# Patient Record
Sex: FEMALE | Race: WHITE | NOT HISPANIC OR LATINO
[De-identification: names, ages, dates, MRNs, and addresses within clinical notes are randomized per-mention and may not be internally consistent; named-entity substitution may affect disease eponyms.]

---

## 2019-04-19 ENCOUNTER — RECORD ABSTRACTING (OUTPATIENT)
Age: 29
End: 2019-04-19

## 2019-04-19 LAB — CYTOLOGY CVX/VAG DOC THIN PREP: NORMAL

## 2019-04-22 ENCOUNTER — APPOINTMENT (OUTPATIENT)
Dept: OBGYN | Facility: CLINIC | Age: 29
End: 2019-04-22
Payer: COMMERCIAL

## 2019-04-22 ENCOUNTER — TRANSCRIPTION ENCOUNTER (OUTPATIENT)
Age: 29
End: 2019-04-22

## 2019-04-22 ENCOUNTER — APPOINTMENT (OUTPATIENT)
Dept: OBGYN | Facility: CLINIC | Age: 29
End: 2019-04-22

## 2019-04-22 VITALS
DIASTOLIC BLOOD PRESSURE: 90 MMHG | BODY MASS INDEX: 23.41 KG/M2 | HEIGHT: 61 IN | SYSTOLIC BLOOD PRESSURE: 120 MMHG | WEIGHT: 124 LBS

## 2019-04-22 LAB
BILIRUB UR QL STRIP: NORMAL
CLARITY UR: CLEAR
COLLECTION METHOD: NORMAL
GLUCOSE UR-MCNC: NORMAL
HCG UR QL: 0.2 EU/DL
HCG UR QL: NEGATIVE
HGB UR QL STRIP.AUTO: NORMAL
KETONES UR-MCNC: NORMAL
LEUKOCYTE ESTERASE UR QL STRIP: NORMAL
NITRITE UR QL STRIP: NORMAL
PH UR STRIP: 7.5
PROT UR STRIP-MCNC: NORMAL
QUALITY CONTROL: YES
SP GR UR STRIP: 1.01

## 2019-04-22 PROCEDURE — 81025 URINE PREGNANCY TEST: CPT

## 2019-04-22 PROCEDURE — 81003 URINALYSIS AUTO W/O SCOPE: CPT | Mod: QW

## 2019-04-22 PROCEDURE — 99395 PREV VISIT EST AGE 18-39: CPT

## 2019-04-22 NOTE — PROCEDURE
[Cervical Pap Smear] : cervical Pap smear [GC & Chlamydia via Pap] : GC & Chlamydia via Pap [Liquid Base] : liquid base [Tolerated Well] : the patient tolerated the procedure well [No Complications] : there were no complications

## 2019-04-22 NOTE — PHYSICAL EXAM
[Awake] : awake [Alert] : alert [Mass] : no breast mass [Nipple Discharge] : no nipple discharge [Soft] : soft [Axillary LAD] : no axillary lymphadenopathy [Tender] : non tender [Distended] : not distended [Oriented x3] : oriented to person, place, and time [Normal] : uterus [Motion Tenderness] : there was no cervical motion tenderness [Tenderness] : nontender [Enlarged ___ wks] : not enlarged [Mass ___ cm] : no uterine mass was palpated [Uterine Adnexae] : were not tender and not enlarged [Adnexa Tenderness] : were not tender [Ovarian Mass (___ Cm)] : there were no adnexal masses

## 2019-04-22 NOTE — HISTORY OF PRESENT ILLNESS
[___ Year(s) Ago] : [unfilled] year(s) ago [Healthy Diet] : a healthy diet [Good] : being in good health [Regular Exercise] : regular exercise [Last Pap ___] : Last cervical pap smear was [unfilled] [Reproductive Age] : is of reproductive age [Definite:  ___ (Date)] : the last menstrual period was [unfilled] [Menarche Age: ____] : age at menarche was [unfilled] [Sexually Active] : is sexually active [Monogamous] : is monogamous [Condoms] : uses condoms [Contraception] : uses contraception [Male ___] : [unfilled] male [NA] : N/A [Weight Concerns] : no concerns with her weight [Pregnancy History] : denies prior pregnancies

## 2019-04-22 NOTE — DISCUSSION/SUMMARY
[FreeTextEntry1] : > 20 mins spent discussion contraceptive options. She is most interested in the LNG-IUC.\par \par We discussed the LNG- IUC device and insertion procedure.  We reviewed the risks, benefits and alternatives.  Specifically we reviewed risks including but not limited to perforation, migration, infection and expulsion.  The expected side effects were reviewed.  She was provided with an informational pamphlet for Benji.\par \par f/u when she makes decision about the device.

## 2019-04-22 NOTE — COUNSELING
[Breast Self Exam] : breast self exam [Nutrition] : nutrition [Exercise] : exercise [STD (testing, results, tx)] : STD (testing, results, tx) [Vitamins/Supplements] : vitamins/supplements [Contraception] : contraception

## 2019-04-23 LAB
C TRACH RRNA SPEC QL NAA+PROBE: NOT DETECTED
HPV HIGH+LOW RISK DNA PNL CVX: NOT DETECTED
N GONORRHOEA RRNA SPEC QL NAA+PROBE: NOT DETECTED
SOURCE TP AMPLIFICATION: NORMAL

## 2019-04-25 LAB — CYTOLOGY CVX/VAG DOC THIN PREP: NORMAL

## 2019-07-09 ENCOUNTER — APPOINTMENT (OUTPATIENT)
Dept: OBGYN | Facility: CLINIC | Age: 29
End: 2019-07-09
Payer: COMMERCIAL

## 2019-07-09 VITALS
SYSTOLIC BLOOD PRESSURE: 106 MMHG | DIASTOLIC BLOOD PRESSURE: 70 MMHG | BODY MASS INDEX: 23.6 KG/M2 | WEIGHT: 125 LBS | HEIGHT: 61 IN

## 2019-07-09 PROCEDURE — 99213 OFFICE O/P EST LOW 20 MIN: CPT

## 2019-07-09 NOTE — DISCUSSION/SUMMARY
[FreeTextEntry1] : We discussed the Kyleena device and insertion procedure.  We reviewed the risks, benefits and alternatives.  Specifically we reviewed risks including but not limited to perforation, migration, infection and expulsion.  The expected side effects were reviewed.  Timing of insertion was discussed and pt will call for an appointment. She will premedicate with cytotec and ibuprofen.\par

## 2019-07-09 NOTE — HISTORY OF PRESENT ILLNESS
[___ Month(s) Ago] : [unfilled] month(s) ago [Good] : being in good health [Healthy Diet] : a healthy diet [Regular Exercise] : regular exercise [Last Pap ___] : Last cervical pap smear was [unfilled] [Reproductive Age] : is of reproductive age [Last Screen ___] : last STD screen was [unfilled] [Definite:  ___ (Date)] : the last menstrual period was [unfilled] [Menarche Age: ____] : age at menarche was [unfilled] [Sexually Active] : is sexually active [Monogamous] : is monogamous [Male ___] : [unfilled] male [Weight Concerns] : no concerns with her weight [Contraception] : does not use contraception

## 2019-07-18 LAB
C TRACH RRNA SPEC QL NAA+PROBE: NOT DETECTED
N GONORRHOEA RRNA SPEC QL NAA+PROBE: NOT DETECTED
SOURCE AMPLIFICATION: NORMAL

## 2019-07-22 ENCOUNTER — APPOINTMENT (OUTPATIENT)
Dept: OBGYN | Facility: CLINIC | Age: 29
End: 2019-07-22
Payer: COMMERCIAL

## 2019-07-22 VITALS
HEIGHT: 61 IN | SYSTOLIC BLOOD PRESSURE: 108 MMHG | DIASTOLIC BLOOD PRESSURE: 70 MMHG | WEIGHT: 126 LBS | BODY MASS INDEX: 23.79 KG/M2

## 2019-07-22 LAB
HCG UR QL: NEGATIVE
QUALITY CONTROL: YES

## 2019-07-22 PROCEDURE — XXXXX: CPT

## 2019-07-22 NOTE — HISTORY OF PRESENT ILLNESS
[Last Pap ___] : Last cervical pap smear was [unfilled] [Reproductive Age] : is of reproductive age [Last Screen ___] : last STD screen was [unfilled] [Irregular Menses] : irregular menses [Definite:  ___ (Date)] : the last menstrual period was [unfilled] [Menarche Age: ____] : age at menarche was [unfilled] [Sexually Active] : is sexually active [Contraception] : uses contraception [Condoms] : uses condoms [Male ___] : [unfilled] male

## 2019-07-25 LAB
BASOPHILS # BLD AUTO: 0.04 K/UL
BASOPHILS NFR BLD AUTO: 0.6 %
BILIRUB UR QL STRIP: NORMAL
C TRACH RRNA SPEC QL NAA+PROBE: NOT DETECTED
CANDIDA VAG CYTO: NOT DETECTED
COLLECTION METHOD: NORMAL
EOSINOPHIL # BLD AUTO: 0.11 K/UL
EOSINOPHIL NFR BLD AUTO: 1.7 %
ESTRADIOL SERPL-MCNC: 155 PG/ML
FSH SERPL-MCNC: 4.7 IU/L
G VAGINALIS+PREV SP MTYP VAG QL MICRO: NOT DETECTED
GLUCOSE UR-MCNC: NORMAL
HCG SERPL-MCNC: <1 MIU/ML
HCG UR QL: 0.2 EU/DL
HCT VFR BLD CALC: 38.9 %
HGB BLD-MCNC: 12.8 G/DL
HGB UR QL STRIP.AUTO: NORMAL
IMM GRANULOCYTES NFR BLD AUTO: 0.2 %
KETONES UR-MCNC: NORMAL
LEUKOCYTE ESTERASE UR QL STRIP: NORMAL
LYMPHOCYTES # BLD AUTO: 2.19 K/UL
LYMPHOCYTES NFR BLD AUTO: 33.3 %
MAN DIFF?: NORMAL
MCHC RBC-ENTMCNC: 29.1 PG
MCHC RBC-ENTMCNC: 32.9 GM/DL
MCV RBC AUTO: 88.4 FL
MONOCYTES # BLD AUTO: 0.5 K/UL
MONOCYTES NFR BLD AUTO: 7.6 %
N GONORRHOEA RRNA SPEC QL NAA+PROBE: NOT DETECTED
NEUTROPHILS # BLD AUTO: 3.73 K/UL
NEUTROPHILS NFR BLD AUTO: 56.6 %
NITRITE UR QL STRIP: NORMAL
PH UR STRIP: 7
PLATELET # BLD AUTO: 224 K/UL
PROLACTIN SERPL-MCNC: 19 NG/ML
PROT UR STRIP-MCNC: NORMAL
RBC # BLD: 4.4 M/UL
RBC # FLD: 12.5 %
SOURCE AMPLIFICATION: NORMAL
SP GR UR STRIP: 1.01
T VAGINALIS VAG QL WET PREP: NOT DETECTED
TSH SERPL-ACNC: 2.23 UIU/ML
WBC # FLD AUTO: 6.58 K/UL

## 2019-08-01 ENCOUNTER — RESULT CHARGE (OUTPATIENT)
Age: 29
End: 2019-08-01

## 2019-08-01 ENCOUNTER — APPOINTMENT (OUTPATIENT)
Dept: OBGYN | Facility: CLINIC | Age: 29
End: 2019-08-01
Payer: COMMERCIAL

## 2019-08-01 VITALS
SYSTOLIC BLOOD PRESSURE: 116 MMHG | BODY MASS INDEX: 24.17 KG/M2 | WEIGHT: 128 LBS | HEIGHT: 61 IN | DIASTOLIC BLOOD PRESSURE: 70 MMHG

## 2019-08-01 PROCEDURE — 76376 3D RENDER W/INTRP POSTPROCES: CPT | Mod: 59

## 2019-08-01 PROCEDURE — 99213 OFFICE O/P EST LOW 20 MIN: CPT | Mod: 25

## 2019-08-01 PROCEDURE — 81003 URINALYSIS AUTO W/O SCOPE: CPT | Mod: QW

## 2019-08-01 PROCEDURE — 76830 TRANSVAGINAL US NON-OB: CPT

## 2019-08-01 PROCEDURE — 81025 URINE PREGNANCY TEST: CPT

## 2019-08-01 NOTE — HISTORY OF PRESENT ILLNESS
[___ Month(s) Ago] : [unfilled] month(s) ago [Last Pap ___] : Last cervical pap smear was [unfilled] [Reproductive Age] : is of reproductive age [HPV Vaccine NA Due to Age] : HPV vaccine not available to patient due to age [Definite:  ___ (Date)] : the last menstrual period was [unfilled] [Menarche Age: ____] : age at menarche was [unfilled] [Sexually Active] : is sexually active [Monogamous] : is monogamous [Condoms] : uses condoms [Contraception] : uses contraception [Male ___] : [unfilled] male

## 2019-08-02 NOTE — PHYSICAL EXAM
[Normal] : uterus [Motion Tenderness] : there was no cervical motion tenderness [Tenderness] : nontender [Enlarged ___ wks] : not enlarged [Mass ___ cm] : no uterine mass was palpated [Uterine Adnexae] : were not tender and not enlarged

## 2019-08-02 NOTE — DISCUSSION/SUMMARY
[FreeTextEntry1] : 1. follow up 6 weeks- pelvic sono for cyst surveillance.  call parameters for pain reviewed. sxs of ovarian torsion reviewed.\par 2. post coital bleeding likely from cervix- slight bleeding noted today on exam.  confirm negative gc/ct- if negative she will monitor.\par 3. tx for yeast provided.\par 4. ok for IUD after sonogram follow up pending results

## 2019-08-05 LAB
BILIRUB UR QL STRIP: NORMAL
C TRACH RRNA SPEC QL NAA+PROBE: NOT DETECTED
CLARITY UR: CLEAR
COLLECTION METHOD: NORMAL
GLUCOSE UR-MCNC: NORMAL
HCG UR QL: 0.2 EU/DL
HCG UR QL: NEGATIVE
HGB UR QL STRIP.AUTO: ABNORMAL
KETONES UR-MCNC: NORMAL
LEUKOCYTE ESTERASE UR QL STRIP: NORMAL
N GONORRHOEA RRNA SPEC QL NAA+PROBE: NOT DETECTED
NITRITE UR QL STRIP: NORMAL
PH UR STRIP: 8.5
PROT UR STRIP-MCNC: NORMAL
QUALITY CONTROL: YES
SOURCE AMPLIFICATION: NORMAL
SP GR UR STRIP: 1.02

## 2019-09-07 ENCOUNTER — APPOINTMENT (OUTPATIENT)
Dept: OBGYN | Facility: CLINIC | Age: 29
End: 2019-09-07

## 2019-09-23 ENCOUNTER — APPOINTMENT (OUTPATIENT)
Dept: OBGYN | Facility: CLINIC | Age: 29
End: 2019-09-23
Payer: COMMERCIAL

## 2019-09-23 VITALS
SYSTOLIC BLOOD PRESSURE: 116 MMHG | WEIGHT: 125 LBS | BODY MASS INDEX: 23.6 KG/M2 | DIASTOLIC BLOOD PRESSURE: 72 MMHG | HEIGHT: 61 IN

## 2019-09-23 PROCEDURE — 76830 TRANSVAGINAL US NON-OB: CPT

## 2019-09-23 PROCEDURE — 99213 OFFICE O/P EST LOW 20 MIN: CPT

## 2019-09-23 NOTE — DISCUSSION/SUMMARY
[FreeTextEntry1] : We reviewed her pelvic sonogram showing an interval reduction in the size of the complex right ovarian cyst.  There is a newcomplex left ovarian cyst.\par \par Recommend f/u pelvic US in 2-3 mos and MD visit same day to review results

## 2019-09-23 NOTE — HISTORY OF PRESENT ILLNESS
[___ Month(s) Ago] : [unfilled] month(s) ago [Reproductive Age] : is of reproductive age [Last Pap ___] : Last cervical pap smear was [unfilled] [Definite:  ___ (Date)] : the last menstrual period was [unfilled] [Menarche Age: ____] : age at menarche was [unfilled] [Sexually Active] : is sexually active [Contraception] : uses contraception [Condoms] : uses condoms [Male ___] : [unfilled] male

## 2019-11-27 ENCOUNTER — ASOB RESULT (OUTPATIENT)
Age: 29
End: 2019-11-27

## 2019-11-27 ENCOUNTER — APPOINTMENT (OUTPATIENT)
Dept: OBGYN | Facility: CLINIC | Age: 29
End: 2019-11-27
Payer: COMMERCIAL

## 2019-11-27 VITALS
WEIGHT: 125 LBS | SYSTOLIC BLOOD PRESSURE: 110 MMHG | BODY MASS INDEX: 23.6 KG/M2 | HEIGHT: 61 IN | DIASTOLIC BLOOD PRESSURE: 70 MMHG

## 2019-11-27 PROCEDURE — 99214 OFFICE O/P EST MOD 30 MIN: CPT | Mod: 25

## 2019-11-27 PROCEDURE — 76830 TRANSVAGINAL US NON-OB: CPT

## 2019-11-27 NOTE — HISTORY OF PRESENT ILLNESS
[Last Pap ___] : Last cervical pap smear was [unfilled] [Reproductive Age] : is of reproductive age [Last Screen ___] : last STD screen was [unfilled] [Definite:  ___ (Date)] : the last menstrual period was [unfilled] [Menarche Age: ____] : age at menarche was [unfilled] [Sexually Active] : is sexually active [Contraception] : uses contraception [Condoms] : uses condoms [Male ___] : [unfilled] male [NA] : N/A [de-identified] : PELVIC US 11/27/2019

## 2019-11-27 NOTE — REVIEW OF SYSTEMS
[Nl] : Hematologic/Lymphatic [Fever] : no fever [Chills] : no chills [Pelvic Pain] : pelvic pain [Abn Vag Bleeding] : abnormal vaginal bleeding

## 2020-02-17 ENCOUNTER — ASOB RESULT (OUTPATIENT)
Age: 30
End: 2020-02-17

## 2020-02-17 ENCOUNTER — APPOINTMENT (OUTPATIENT)
Dept: OBGYN | Facility: CLINIC | Age: 30
End: 2020-02-17
Payer: COMMERCIAL

## 2020-02-17 VITALS
DIASTOLIC BLOOD PRESSURE: 78 MMHG | WEIGHT: 125 LBS | SYSTOLIC BLOOD PRESSURE: 112 MMHG | BODY MASS INDEX: 23.6 KG/M2 | HEIGHT: 61 IN

## 2020-02-17 DIAGNOSIS — Z30.09 ENCOUNTER FOR OTHER GENERAL COUNSELING AND ADVICE ON CONTRACEPTION: ICD-10-CM

## 2020-02-17 DIAGNOSIS — G43.829 MENSTRUAL MIGRAINE, NOT INTRACTABLE, W/OUT STATUS MIGRAINOSUS: ICD-10-CM

## 2020-02-17 DIAGNOSIS — Z86.19 PERSONAL HISTORY OF OTHER INFECTIOUS AND PARASITIC DISEASES: ICD-10-CM

## 2020-02-17 DIAGNOSIS — Z87.42 PERSONAL HISTORY OF OTHER DISEASES OF THE FEMALE GENITAL TRACT: ICD-10-CM

## 2020-02-17 DIAGNOSIS — B00.9 HERPESVIRAL INFECTION, UNSPECIFIED: ICD-10-CM

## 2020-02-17 DIAGNOSIS — Z87.09 PERSONAL HISTORY OF OTHER DISEASES OF THE RESPIRATORY SYSTEM: ICD-10-CM

## 2020-02-17 DIAGNOSIS — J38.3 OTHER DISEASES OF VOCAL CORDS: ICD-10-CM

## 2020-02-17 DIAGNOSIS — K21.0 GASTRO-ESOPHAGEAL REFLUX DISEASE WITH ESOPHAGITIS: ICD-10-CM

## 2020-02-17 DIAGNOSIS — M25.569 PAIN IN UNSPECIFIED KNEE: ICD-10-CM

## 2020-02-17 DIAGNOSIS — M23.91 UNSPECIFIED INTERNAL DERANGEMENT OF RIGHT KNEE: ICD-10-CM

## 2020-02-17 DIAGNOSIS — Z87.898 PERSONAL HISTORY OF OTHER SPECIFIED CONDITIONS: ICD-10-CM

## 2020-02-17 DIAGNOSIS — H90.5 UNSPECIFIED SENSORINEURAL HEARING LOSS: ICD-10-CM

## 2020-02-17 DIAGNOSIS — N76.0 ACUTE VAGINITIS: ICD-10-CM

## 2020-02-17 DIAGNOSIS — H90.2 CONDUCTIVE HEARING LOSS, UNSPECIFIED: ICD-10-CM

## 2020-02-17 DIAGNOSIS — Z20.828 CONTACT WITH AND (SUSPECTED) EXPOSURE TO OTHER VIRAL COMMUNICABLE DISEASES: ICD-10-CM

## 2020-02-17 DIAGNOSIS — B96.89 ACUTE PHARYNGITIS DUE TO OTHER SPECIFIED ORGANISMS: ICD-10-CM

## 2020-02-17 DIAGNOSIS — J38.4 EDEMA OF LARYNX: ICD-10-CM

## 2020-02-17 DIAGNOSIS — Z09 ENCOUNTER FOR FOLLOW-UP EXAMINATION AFTER COMPLETED TREATMENT FOR CONDITIONS OTHER THAN MALIGNANT NEOPLASM: ICD-10-CM

## 2020-02-17 DIAGNOSIS — N83.299 OTHER OVARIAN CYST, UNSPECIFIED SIDE: ICD-10-CM

## 2020-02-17 DIAGNOSIS — Z01.419 ENCOUNTER FOR GYNECOLOGICAL EXAMINATION (GENERAL) (ROUTINE) W/OUT ABNORMAL FINDINGS: ICD-10-CM

## 2020-02-17 DIAGNOSIS — N93.0 POSTCOITAL AND CONTACT BLEEDING: ICD-10-CM

## 2020-02-17 DIAGNOSIS — J02.8 ACUTE PHARYNGITIS DUE TO OTHER SPECIFIED ORGANISMS: ICD-10-CM

## 2020-02-17 DIAGNOSIS — Z87.440 PERSONAL HISTORY OF URINARY (TRACT) INFECTIONS: ICD-10-CM

## 2020-02-17 PROCEDURE — 99213 OFFICE O/P EST LOW 20 MIN: CPT | Mod: 25

## 2020-02-17 PROCEDURE — 76830 TRANSVAGINAL US NON-OB: CPT

## 2020-02-17 NOTE — DISCUSSION/SUMMARY
[FreeTextEntry1] : we reviewed her sonogram results- no change in cysts.  we discussed the possible polyp and how this may have contributed to her irregular bleeding.  this was not previously noted on US. We discussed the potential need for a hysteroscopy.  \par \par she is worried about the possibility of endometriosis and what this may mean for her future fertility as well.\par \par she will continue with the nuvaring for now.  I will review her sono with MD and call pt with further recommendations.\par \par

## 2020-02-17 NOTE — HISTORY OF PRESENT ILLNESS
[Reproductive Age] : is of reproductive age [Last Pap ___] : Last cervical pap smear was [unfilled] [Intravaginal Ring] : uses the intravaginal ring [Definite:  ___ (Date)] : the last menstrual period was [unfilled] [Menarche Age: ____] : age at menarche was [unfilled] [Monogamous] : is monogamous [Sexually Active] : is sexually active [Contraception] : uses contraception [Vaginal Ring] : uses a vaginal ring [Condoms] : uses condoms [No] : no [Male ___] : [unfilled] male [___ Month(s) Ago] : [unfilled] month(s) ago [Good] : being in good health [Regular Exercise] : regular exercise [Menstrual Problems] : reports normal menses [Pregnancy History] : denies prior pregnancies [Fever] : no fever [Vomiting] : no vomiting [Diarrhea] : no diarrhea [Nausea] : no nausea [Pelvic Pressure] : no pelvic pressure [Dysuria] : no dysuria [Vaginal Bleeding] : no vaginal bleeding

## 2020-03-02 ENCOUNTER — TRANSCRIPTION ENCOUNTER (OUTPATIENT)
Age: 30
End: 2020-03-02

## 2020-03-11 ENCOUNTER — APPOINTMENT (OUTPATIENT)
Dept: OBGYN | Facility: CLINIC | Age: 30
End: 2020-03-11
Payer: COMMERCIAL

## 2020-03-11 VITALS
SYSTOLIC BLOOD PRESSURE: 108 MMHG | DIASTOLIC BLOOD PRESSURE: 72 MMHG | HEIGHT: 61 IN | WEIGHT: 122 LBS | BODY MASS INDEX: 23.03 KG/M2

## 2020-03-11 DIAGNOSIS — N84.0 POLYP OF CORPUS UTERI: ICD-10-CM

## 2020-03-11 LAB
HCG UR QL: NEGATIVE
QUALITY CONTROL: YES

## 2020-03-11 PROCEDURE — 58558Z: CUSTOM

## 2020-03-11 PROCEDURE — 81025 URINE PREGNANCY TEST: CPT

## 2020-03-11 NOTE — REVIEW OF SYSTEMS
[Breast Pain] : breast pain [Headache] : headache [Nl] : Hematologic/Lymphatic [Fever] : no fever [Chills] : no chills [Pelvic Pain] : no pelvic pain [Abn Vag Bleeding] : abnormal vaginal bleeding

## 2020-03-11 NOTE — HISTORY OF PRESENT ILLNESS
[Last Pap ___] : Last cervical pap smear was [unfilled] [Reproductive Age] : is of reproductive age [Last Screen ___] : last STD screen was [unfilled] [unknown] : the patient is unsure of the date of her LMP [Menarche Age: ____] : age at menarche was [unfilled] [Sexually Active] : is sexually active [Contraception] : uses contraception [Vaginal Ring] : uses a vaginal ring [Male ___] : [unfilled] male [NA] : N/A [de-identified] : Pelvic us 2/17/2020

## 2020-03-16 ENCOUNTER — TRANSCRIPTION ENCOUNTER (OUTPATIENT)
Age: 30
End: 2020-03-16

## 2020-03-16 LAB — CORE LAB BIOPSY: NORMAL

## 2020-04-06 ENCOUNTER — TRANSCRIPTION ENCOUNTER (OUTPATIENT)
Age: 30
End: 2020-04-06

## 2020-04-06 DIAGNOSIS — Z30.40 ENCOUNTER FOR SURVEILLANCE OF CONTRACEPTIVES, UNSPECIFIED: ICD-10-CM

## 2020-04-13 ENCOUNTER — TRANSCRIPTION ENCOUNTER (OUTPATIENT)
Age: 30
End: 2020-04-13

## 2020-04-28 ENCOUNTER — TRANSCRIPTION ENCOUNTER (OUTPATIENT)
Age: 30
End: 2020-04-28

## 2020-04-30 ENCOUNTER — TRANSCRIPTION ENCOUNTER (OUTPATIENT)
Age: 30
End: 2020-04-30

## 2020-05-01 ENCOUNTER — TRANSCRIPTION ENCOUNTER (OUTPATIENT)
Age: 30
End: 2020-05-01

## 2020-05-18 ENCOUNTER — ASOB RESULT (OUTPATIENT)
Age: 30
End: 2020-05-18

## 2020-05-18 ENCOUNTER — APPOINTMENT (OUTPATIENT)
Dept: OBGYN | Facility: CLINIC | Age: 30
End: 2020-05-18
Payer: COMMERCIAL

## 2020-05-18 VITALS
SYSTOLIC BLOOD PRESSURE: 112 MMHG | BODY MASS INDEX: 23.03 KG/M2 | HEIGHT: 61 IN | WEIGHT: 122 LBS | DIASTOLIC BLOOD PRESSURE: 64 MMHG

## 2020-05-18 DIAGNOSIS — N93.9 ABNORMAL UTERINE AND VAGINAL BLEEDING, UNSPECIFIED: ICD-10-CM

## 2020-05-18 DIAGNOSIS — N83.8 OTHER NONINFLAMMATORY DISORDERS OF OVARY, FALLOPIAN TUBE AND BROAD LIGAMENT: ICD-10-CM

## 2020-05-18 DIAGNOSIS — N92.1 EXCESSIVE AND FREQUENT MENSTRUATION WITH IRREGULAR CYCLE: ICD-10-CM

## 2020-05-18 PROCEDURE — 76830 TRANSVAGINAL US NON-OB: CPT

## 2020-05-18 PROCEDURE — 99395 PREV VISIT EST AGE 18-39: CPT

## 2020-05-18 PROCEDURE — 99214 OFFICE O/P EST MOD 30 MIN: CPT | Mod: 25

## 2020-05-18 PROCEDURE — 36415 COLL VENOUS BLD VENIPUNCTURE: CPT

## 2020-05-18 PROCEDURE — 81025 URINE PREGNANCY TEST: CPT

## 2020-05-18 NOTE — CHIEF COMPLAINT
[Annual Visit] : annual visit [FreeTextEntry1] : sono and annual- c/o bleeding every day for the past 5 months

## 2020-05-18 NOTE — REVIEW OF SYSTEMS
[Nl] : Integumentary [Abn Vag Bleeding] : abnormal vaginal bleeding [Fever] : no fever [Chills] : no chills [Chest Pain] : no chest pain [Palpitations] : no palpitations [Dyspnea] : no shortness of breath [Wheezing] : no wheezing [Cough] : no cough [SOB on Exertion] : no shortness of breath during exertion [Abdominal Pain] : no abdominal pain [Vomiting] : no vomiting [Pelvic Pain] : pelvic pain

## 2020-05-18 NOTE — COUNSELING
[Breast Self Exam] : breast self exam [STD (testing, results, tx)] : STD (testing, results, tx) [Contraception] : contraception [Safe Sexual Practices] : safe sexual practices

## 2020-05-18 NOTE — HISTORY OF PRESENT ILLNESS
[1 Year Ago] : 1 year ago [Good] : being in good health [Last Pap ___] : Last cervical pap smear was [unfilled] [Reproductive Age] : is of reproductive age [Contraception] : uses contraception [Menarche Age: ____] : age at menarche was [unfilled] [Sexually Active] : is sexually active [Male ___] : [unfilled] male [unknown] : the patient is unsure of the date of her LMP [Menstrual Problems] : reports normal menses [Pregnancy History] : denies prior pregnancies

## 2020-05-18 NOTE — PHYSICAL EXAM
[Awake] : awake [Alert] : alert [Acute Distress] : no acute distress [Mass] : no breast mass [Axillary LAD] : no axillary lymphadenopathy [Nipple Discharge] : no nipple discharge [Tender] : non tender [Soft] : soft [Distended] : not distended [Oriented x3] : oriented to person, place, and time [Depressed Mood] : not depressed [Flat Affect] : affect not flat [Normal] : uterus [Labia Majora] : labia major [Moderate] : there was moderate vaginal bleeding [Labia Minora] : labia minora [Pap Obtained] : a Pap smear was performed [Tenderness] : nontender [Adnexa Tenderness] : were not tender

## 2020-05-19 LAB
BASOPHILS # BLD AUTO: 0.04 K/UL
BASOPHILS NFR BLD AUTO: 0.5 %
C TRACH RRNA SPEC QL NAA+PROBE: NOT DETECTED
EOSINOPHIL # BLD AUTO: 0.18 K/UL
EOSINOPHIL NFR BLD AUTO: 2.2 %
FSH SERPL-MCNC: 3.1 IU/L
HCG UR QL: NEGATIVE
HCT VFR BLD CALC: 44 %
HGB BLD-MCNC: 14.1 G/DL
HPV HIGH+LOW RISK DNA PNL CVX: NOT DETECTED
IMM GRANULOCYTES NFR BLD AUTO: 0.2 %
LH SERPL-ACNC: 1.9 IU/L
LYMPHOCYTES # BLD AUTO: 2.66 K/UL
LYMPHOCYTES NFR BLD AUTO: 32.5 %
MAN DIFF?: NORMAL
MCHC RBC-ENTMCNC: 28.4 PG
MCHC RBC-ENTMCNC: 32 GM/DL
MCV RBC AUTO: 88.5 FL
MONOCYTES # BLD AUTO: 0.63 K/UL
MONOCYTES NFR BLD AUTO: 7.7 %
N GONORRHOEA RRNA SPEC QL NAA+PROBE: NOT DETECTED
NEUTROPHILS # BLD AUTO: 4.65 K/UL
NEUTROPHILS NFR BLD AUTO: 56.9 %
PLATELET # BLD AUTO: 269 K/UL
PROLACTIN SERPL-MCNC: 18 NG/ML
QUALITY CONTROL: YES
RBC # BLD: 4.97 M/UL
RBC # FLD: 12.2 %
SOURCE TP AMPLIFICATION: NORMAL
TSH SERPL-ACNC: 2.35 UIU/ML
WBC # FLD AUTO: 8.18 K/UL

## 2020-05-20 LAB
CA 125 (LABCORP): 34.2 U/ML
CYTOLOGY CVX/VAG DOC THIN PREP: NORMAL
HE 4: 52.3 PMOL/L
POSTMENOPAUSAL ROMA: 2
PREMENOPAUSAL ROMA: 0.86
ROMA COMMENT: NORMAL

## 2020-05-28 ENCOUNTER — RESULT REVIEW (OUTPATIENT)
Age: 30
End: 2020-05-28

## 2020-05-28 ENCOUNTER — OUTPATIENT (OUTPATIENT)
Dept: OUTPATIENT SERVICES | Facility: HOSPITAL | Age: 30
LOS: 1 days | End: 2020-05-28

## 2020-05-28 ENCOUNTER — APPOINTMENT (OUTPATIENT)
Dept: MRI IMAGING | Facility: CLINIC | Age: 30
End: 2020-05-28
Payer: COMMERCIAL

## 2020-05-28 DIAGNOSIS — N93.9 ABNORMAL UTERINE AND VAGINAL BLEEDING, UNSPECIFIED: ICD-10-CM

## 2020-05-28 PROCEDURE — 72197 MRI PELVIS W/O & W/DYE: CPT | Mod: 26

## 2020-05-28 PROCEDURE — 74183 MRI ABD W/O CNTR FLWD CNTR: CPT | Mod: 26

## 2020-06-05 ENCOUNTER — APPOINTMENT (OUTPATIENT)
Dept: OBGYN | Facility: CLINIC | Age: 30
End: 2020-06-05
Payer: COMMERCIAL

## 2020-06-05 VITALS
WEIGHT: 127 LBS | BODY MASS INDEX: 23.98 KG/M2 | HEIGHT: 61 IN | DIASTOLIC BLOOD PRESSURE: 68 MMHG | SYSTOLIC BLOOD PRESSURE: 120 MMHG

## 2020-06-05 DIAGNOSIS — Z87.42 PERSONAL HISTORY OF OTHER DISEASES OF THE FEMALE GENITAL TRACT: ICD-10-CM

## 2020-06-05 LAB
BILIRUB UR QL STRIP: NORMAL
GLUCOSE UR-MCNC: NORMAL
HCG UR QL: 0.2 EU/DL
HGB UR QL STRIP.AUTO: NORMAL
KETONES UR-MCNC: NORMAL
LEUKOCYTE ESTERASE UR QL STRIP: NORMAL
NITRITE UR QL STRIP: NORMAL
PH UR STRIP: 7
PROT UR STRIP-MCNC: NORMAL
SP GR UR STRIP: 1.02

## 2020-06-05 PROCEDURE — 81003 URINALYSIS AUTO W/O SCOPE: CPT | Mod: QW

## 2020-06-05 PROCEDURE — 99214 OFFICE O/P EST MOD 30 MIN: CPT

## 2020-06-05 RX ORDER — NORETHINDRONE 0.35 MG/1
0.35 TABLET ORAL DAILY
Qty: 3 | Refills: 1 | Status: DISCONTINUED | COMMUNITY
Start: 2020-05-18 | End: 2020-06-05

## 2020-06-05 RX ORDER — MISOPROSTOL 200 UG/1
200 TABLET ORAL
Qty: 2 | Refills: 0 | Status: DISCONTINUED | COMMUNITY
Start: 2020-03-02 | End: 2020-06-05

## 2020-06-05 RX ORDER — ETONOGESTREL AND ETHINYL ESTRADIOL .12; .015 MG/D; MG/D
0.12-0.015 INSERT, EXTENDED RELEASE VAGINAL
Qty: 1 | Refills: 1 | Status: DISCONTINUED | COMMUNITY
Start: 2019-11-27 | End: 2020-06-05

## 2020-06-06 PROBLEM — Z87.42 HISTORY OF ABNORMAL UTERINE BLEEDING: Status: RESOLVED | Noted: 2019-07-22 | Resolved: 2020-06-06

## 2020-06-06 NOTE — HISTORY OF PRESENT ILLNESS
[Healthy Diet] : a healthy diet [Good] : being in good health [Regular Exercise] : regular exercise [Reproductive Age] : is of reproductive age [Oral Contraceptive] : uses oral contraception pills [unknown] : the patient is unsure of the date of her LMP [Menarche Age: ____] : age at menarche was [unfilled] [Sexually Active] : is sexually active [Male ___] : [unfilled] male [Contraception] : uses contraception [Last Pap ___] : Last cervical pap smear was [unfilled] [Weight Concerns] : no concerns with her weight [Pregnancy History] : denies prior pregnancies [Menstrual Problems] : reports normal menses

## 2020-06-06 NOTE — REVIEW OF SYSTEMS
[Excessive Sweating] : excessive sweating [Nl] : Hematologic/Lymphatic [Pelvic Pain] : pelvic pain [Abn Vag Bleeding] : abnormal vaginal bleeding

## 2020-06-06 NOTE — PHYSICAL EXAM
[Alert] : alert [Awake] : awake [Soft] : soft [Acute Distress] : no acute distress [Distended] : not distended [Tender] : non tender [Oriented x3] : oriented to person, place, and time [Depressed Mood] : not depressed [No Lesions] : no genitalia lesions [Labia Majora] : labia major [Labia Minora] : labia minora [Normal] : clitoris [Tenderness] : tender [Adnexa Tenderness] : were tender on palpation [RRR, No Murmurs] : RRR, no murmurs [CTAB] : CTAB

## 2020-06-23 ENCOUNTER — APPOINTMENT (OUTPATIENT)
Dept: OBGYN | Facility: CLINIC | Age: 30
End: 2020-06-23
Payer: COMMERCIAL

## 2020-06-23 ENCOUNTER — OUTPATIENT (OUTPATIENT)
Dept: OUTPATIENT SERVICES | Facility: HOSPITAL | Age: 30
LOS: 1 days | End: 2020-06-23
Payer: COMMERCIAL

## 2020-06-23 VITALS
DIASTOLIC BLOOD PRESSURE: 80 MMHG | WEIGHT: 128.53 LBS | SYSTOLIC BLOOD PRESSURE: 119 MMHG | TEMPERATURE: 98 F | HEIGHT: 61 IN | HEART RATE: 71 BPM | RESPIRATION RATE: 20 BRPM

## 2020-06-23 VITALS
WEIGHT: 128 LBS | BODY MASS INDEX: 24.17 KG/M2 | SYSTOLIC BLOOD PRESSURE: 110 MMHG | DIASTOLIC BLOOD PRESSURE: 70 MMHG | HEIGHT: 61 IN

## 2020-06-23 DIAGNOSIS — N83.299 OTHER OVARIAN CYST, UNSPECIFIED SIDE: ICD-10-CM

## 2020-06-23 DIAGNOSIS — R10.2 PELVIC AND PERINEAL PAIN: ICD-10-CM

## 2020-06-23 DIAGNOSIS — Z29.9 ENCOUNTER FOR PROPHYLACTIC MEASURES, UNSPECIFIED: ICD-10-CM

## 2020-06-23 DIAGNOSIS — Z01.818 ENCOUNTER FOR OTHER PREPROCEDURAL EXAMINATION: ICD-10-CM

## 2020-06-23 DIAGNOSIS — Z13.89 ENCOUNTER FOR SCREENING FOR OTHER DISORDER: ICD-10-CM

## 2020-06-23 DIAGNOSIS — K08.409 PARTIAL LOSS OF TEETH, UNSPECIFIED CAUSE, UNSPECIFIED CLASS: Chronic | ICD-10-CM

## 2020-06-23 DIAGNOSIS — N92.1 EXCESSIVE AND FREQUENT MENSTRUATION WITH IRREGULAR CYCLE: ICD-10-CM

## 2020-06-23 LAB
ANION GAP SERPL CALC-SCNC: 13 MMOL/L — SIGNIFICANT CHANGE UP (ref 5–17)
BASOPHILS # BLD AUTO: 0.03 K/UL — SIGNIFICANT CHANGE UP (ref 0–0.2)
BASOPHILS NFR BLD AUTO: 0.4 % — SIGNIFICANT CHANGE UP (ref 0–2)
BUN SERPL-MCNC: 8 MG/DL — SIGNIFICANT CHANGE UP (ref 8–20)
CALCIUM SERPL-MCNC: 9.7 MG/DL — SIGNIFICANT CHANGE UP (ref 8.6–10.2)
CHLORIDE SERPL-SCNC: 102 MMOL/L — SIGNIFICANT CHANGE UP (ref 98–107)
CO2 SERPL-SCNC: 24 MMOL/L — SIGNIFICANT CHANGE UP (ref 22–29)
CREAT SERPL-MCNC: 0.52 MG/DL — SIGNIFICANT CHANGE UP (ref 0.5–1.3)
EOSINOPHIL # BLD AUTO: 0.09 K/UL — SIGNIFICANT CHANGE UP (ref 0–0.5)
EOSINOPHIL NFR BLD AUTO: 1.2 % — SIGNIFICANT CHANGE UP (ref 0–6)
GLUCOSE SERPL-MCNC: 93 MG/DL — SIGNIFICANT CHANGE UP (ref 70–99)
HCG SERPL-ACNC: <4 MIU/ML — SIGNIFICANT CHANGE UP
HCT VFR BLD CALC: 42.8 % — SIGNIFICANT CHANGE UP (ref 34.5–45)
HGB BLD-MCNC: 14 G/DL — SIGNIFICANT CHANGE UP (ref 11.5–15.5)
IMM GRANULOCYTES NFR BLD AUTO: 0.3 % — SIGNIFICANT CHANGE UP (ref 0–1.5)
LYMPHOCYTES # BLD AUTO: 1.81 K/UL — SIGNIFICANT CHANGE UP (ref 1–3.3)
LYMPHOCYTES # BLD AUTO: 23.4 % — SIGNIFICANT CHANGE UP (ref 13–44)
MCHC RBC-ENTMCNC: 29 PG — SIGNIFICANT CHANGE UP (ref 27–34)
MCHC RBC-ENTMCNC: 32.7 GM/DL — SIGNIFICANT CHANGE UP (ref 32–36)
MCV RBC AUTO: 88.8 FL — SIGNIFICANT CHANGE UP (ref 80–100)
MONOCYTES # BLD AUTO: 0.59 K/UL — SIGNIFICANT CHANGE UP (ref 0–0.9)
MONOCYTES NFR BLD AUTO: 7.6 % — SIGNIFICANT CHANGE UP (ref 2–14)
NEUTROPHILS # BLD AUTO: 5.21 K/UL — SIGNIFICANT CHANGE UP (ref 1.8–7.4)
NEUTROPHILS NFR BLD AUTO: 67.1 % — SIGNIFICANT CHANGE UP (ref 43–77)
PLATELET # BLD AUTO: 259 K/UL — SIGNIFICANT CHANGE UP (ref 150–400)
POTASSIUM SERPL-MCNC: 3.6 MMOL/L — SIGNIFICANT CHANGE UP (ref 3.5–5.3)
POTASSIUM SERPL-SCNC: 3.6 MMOL/L — SIGNIFICANT CHANGE UP (ref 3.5–5.3)
RBC # BLD: 4.82 M/UL — SIGNIFICANT CHANGE UP (ref 3.8–5.2)
RBC # FLD: 12.3 % — SIGNIFICANT CHANGE UP (ref 10.3–14.5)
SODIUM SERPL-SCNC: 139 MMOL/L — SIGNIFICANT CHANGE UP (ref 135–145)
WBC # BLD: 7.75 K/UL — SIGNIFICANT CHANGE UP (ref 3.8–10.5)
WBC # FLD AUTO: 7.75 K/UL — SIGNIFICANT CHANGE UP (ref 3.8–10.5)

## 2020-06-23 PROCEDURE — 80048 BASIC METABOLIC PNL TOTAL CA: CPT

## 2020-06-23 PROCEDURE — 85027 COMPLETE CBC AUTOMATED: CPT

## 2020-06-23 PROCEDURE — 99213 OFFICE O/P EST LOW 20 MIN: CPT

## 2020-06-23 PROCEDURE — G0463: CPT

## 2020-06-23 PROCEDURE — 81003 URINALYSIS AUTO W/O SCOPE: CPT | Mod: QW

## 2020-06-23 PROCEDURE — 84702 CHORIONIC GONADOTROPIN TEST: CPT

## 2020-06-23 PROCEDURE — 81025 URINE PREGNANCY TEST: CPT

## 2020-06-23 PROCEDURE — 36415 COLL VENOUS BLD VENIPUNCTURE: CPT

## 2020-06-23 NOTE — PHYSICAL EXAM
[Awake] : awake [Acute Distress] : no acute distress [Alert] : alert [Tender] : non tender [Soft] : soft [Oriented x3] : oriented to person, place, and time [Distended] : not distended [Depressed Mood] : not depressed [RRR, No Murmurs] : RRR, no murmurs [CTAB] : CTAB

## 2020-06-23 NOTE — H&P PST ADULT - NSICDXFAMILYHX_GEN_ALL_CORE_FT
FAMILY HISTORY:  Family history of AICD (automatic internal cardiac defibrillator), grandfather  FH: type 2 diabetes, maternal grandmother  FHx: hypertension, mother, father

## 2020-06-23 NOTE — H&P PST ADULT - ASSESSMENT
30 yo F G0 28 yo F G0 Hx of severe dysmenorrhea and pelvic pain in the past. Recent MRI showed bilateral complex ovarian cystic masses, one suspicious for a dermoid. Pt reports being in good health, regular exercise, healthy diet. Last cervical pap smear on 2020 was negative. Pt is sexually active, on birth control (norethindrone), is unsure of her LMP but denies being pregnant. Presents for preop assessment prior to open laparoscopic ovarian cystectomy, possible fulguration of endometriosis w/Dr Samuel on     OPIOID RISK TOOL    TU EACH BOX THAT APPLIES AND ADD TOTALS AT THE END    FAMILY HISTORY OF SUBSTANCE ABUSE                 FEMALE         MALE                                                Alcohol                             [  ]1 pt          [  ]3pts                                               Illegal Durgs                     [  ]2 pts        [  ]3pts                                               Rx Drugs                           [  ]4 pts        [  ]4 pts    PERSONAL HISTORY OF SUBSTANCE ABUSE                                                                                          Alcohol                             [  ]3 pts       [  ]3 pts                                               Illegal Drugs                     [  ]4 pts        [  ]4 pts                                               Rx Drugs                           [  ]5 pts        [  ]5 pts    AGE BETWEEN 16-45 YEARS                                      [ x ]1 pt         [  ]1 pt    HISTORY OF PREADOLESCENT   SEXUAL ABUSE                                                             [  ]3 pts        [  ]0pts    PSYCHOLOGICAL DISEASE                     ADD, OCD, Bipolar, Schizophrenia        [  ]2 pts         [  ]2 pts                      Depression                                               [  ]1 pt           [  ]1 pt           SCORING TOTAL   (add numbers and type here)              ( 1 )                                     A score of 3 or lower indicated LOW risk for future opioid abuse  A score of 4 to 7 indicated moderate risk for future opioid abuse  A score of 8 or higher indicates a high risk for opioid abuse    CAPRINI SCORE [CLOT]    AGE RELATED RISK FACTORS                                                       MOBILITY RELATED FACTORS  [ ] Age 41-60 years                                            (1 Point)                  [ ] Bed rest                                                        (1 Point)  [ ] Age: 61-74 years                                           (2 Points)                 [ ] Plaster cast                                                   (2 Points)  [ ] Age= 75 years                                              (3 Points)                 [ ] Bed bound for more than 72 hours                 (2 Points)    DISEASE RELATED RISK FACTORS                                               GENDER SPECIFIC FACTORS  [ ] Edema in the lower extremities                       (1 Point)                  [ ] Pregnancy                                                     (1 Point)  [ ] Varicose veins                                               (1 Point)                  [ ] Post-partum < 6 weeks                                   (1 Point)             [ ] BMI > 25 Kg/m2                                            (1 Point)                  [ ] Hormonal therapy  or oral contraception          (1 Point)                 [ ] Sepsis (in the previous month)                        (1 Point)                  [ ] History of pregnancy complications                 (1 point)  [ ] Pneumonia or serious lung disease                                               [ ] Unexplained or recurrent                     (1 Point)           (in the previous month)                               (1 Point)  [ ] Abnormal pulmonary function test                     (1 Point)                 SURGERY RELATED RISK FACTORS  [ ] Acute myocardial infarction                              (1 Point)                 [ ]  Section                                             (1 Point)  [ ] Congestive heart failure (in the previous month)  (1 Point)               [ ] Minor surgery                                                  (1 Point)   [ ] Inflammatory bowel disease                             (1 Point)                 [ ] Arthroscopic surgery                                        (2 Points)  [ ] Central venous access                                      (2 Points)                [x ] General surgery lasting more than 45 minutes   (2 Points)       [ ] Stroke (in the previous month)                          (5 Points)               [ ] Elective arthroplasty                                         (5 Points)                                                                                                                                               HEMATOLOGY RELATED FACTORS                                                 TRAUMA RELATED RISK FACTORS  [ ] Prior episodes of VTE                                     (3 Points)                [ ] Fracture of the hip, pelvis, or leg                       (5 Points)  [ ] Positive family history for VTE                         (3 Points)                 [ ] Acute spinal cord injury (in the previous month)  (5 Points)  [ ] Prothrombin 21670 A                                     (3 Points)                 [ ] Paralysis  (less than 1 month)                             (5 Points)  [ ] Factor V Leiden                                             (3 Points)                  [ ] Multiple Trauma within 1 month                        (5 Points)  [ ] Lupus anticoagulants                                     (3 Points)                                                           [ ] Anticardiolipin antibodies                               (3 Points)                                                       [ ] High homocysteine in the blood                      (3 Points)                                             [ ] Other congenital or acquired thrombophilia      (3 Points)                                                [ ] Heparin induced thrombocytopenia                  (3 Points)                                          Total Score [     2     ]    Caprini Score 0 - 2:  Low Risk, No VTE Prophylaxis required for most patients, encourage ambulation  Caprini Score 3 - 6:  At Risk, pharmacologic VTE prophylaxis is indicated for most patients (in the absence of a contraindication)  Caprini Score Greater than or = 7:  High Risk, pharmacologic VTE prophylaxis is indicated for most patients (in the absence of a contraindication)

## 2020-06-23 NOTE — H&P PST ADULT - NSICDXPASTMEDICALHX_GEN_ALL_CORE_FT
PAST MEDICAL HISTORY:  No pertinent past medical history PAST MEDICAL HISTORY:  Dysmenorrhea, unspecified     Excessive and frequent menstruation with irregular cycle     No pertinent past medical history     Other ovarian cyst

## 2020-06-23 NOTE — HISTORY OF PRESENT ILLNESS
[Last Pap ___] : Last cervical pap smear was [unfilled] [Menarche Age: ____] : age at menarche was [unfilled] [Monogamous] : is monogamous [Sexually Active] : is sexually active [Oral Contraceptives] : uses oral contraceptives [Contraception] : uses contraception [Male ___] : [unfilled] male [FreeTextEntry9] : breast tenderness

## 2020-06-23 NOTE — H&P PST ADULT - HISTORY OF PRESENT ILLNESS
28 yo F 28 yo F G0 Hx of severe dysmenorrhea and pelvic pain in the past. Recent MRI showed bilateral complex ovarian cystic masses, one suspicious for a dermoid. Pt reports being in good health, regular exercise, healthy diet. Last cervical pap smear on 5/18/2020 was negative. Pt is sexually active, on birth control (norethindrone), is unsure of her LMP but denies being pregnant. Presents for preop assessment prior to open laparoscopic ovarian cystectomy, possible fulguration of endometriosis w/Dr Samuel on 6/29

## 2020-06-23 NOTE — H&P PST ADULT - NSALCOHOLPROBLEMSRELYN_GEN_A_CORE_SD
----- Message from John Maddox MD sent at 9/7/2017 11:05 AM CDT -----  Please call. The results of the urinalysis are normal. I will see them again in 2 weeks.    Thanks,  Dr. Maddox  
Conveyed results. Mom verbalizes understanding.   
no

## 2020-06-23 NOTE — H&P PST ADULT - NSICDXPROBLEM_GEN_ALL_CORE_FT
PROBLEM DIAGNOSES  Problem: Excessive and frequent menstruation with irregular cycle  Assessment and Plan: preop assessment, open lap ovarian cystectomy, poss fulguration of endometriosis 6/29    Problem: Other ovarian cyst  Assessment and Plan: preop assessment, open lap ovarian cystectomy, poss fulguration of endometriosis 6/29    Problem: Need for prophylactic measure  Assessment and Plan: caprini score 2, low risk for dvt SCD ordered, surgical team to assess for dvt prophylaxis     Problem: Screening for substance abuse  Assessment and Plan: ort score 1, low risk

## 2020-06-23 NOTE — H&P PST ADULT - NSICDXPASTSURGICALHX_GEN_ALL_CORE_FT
PAST SURGICAL HISTORY:  No significant past surgical history PAST SURGICAL HISTORY:  [place] teeth removed 4/2019

## 2020-06-26 ENCOUNTER — APPOINTMENT (OUTPATIENT)
Dept: DISASTER EMERGENCY | Facility: CLINIC | Age: 30
End: 2020-06-26

## 2020-06-26 ENCOUNTER — TRANSCRIPTION ENCOUNTER (OUTPATIENT)
Age: 30
End: 2020-06-26

## 2020-06-27 LAB — SARS-COV-2 N GENE NPH QL NAA+PROBE: NOT DETECTED

## 2020-06-28 ENCOUNTER — TRANSCRIPTION ENCOUNTER (OUTPATIENT)
Age: 30
End: 2020-06-28

## 2020-06-29 ENCOUNTER — OUTPATIENT (OUTPATIENT)
Dept: INPATIENT UNIT | Facility: HOSPITAL | Age: 30
LOS: 1 days | End: 2020-06-29
Payer: COMMERCIAL

## 2020-06-29 ENCOUNTER — APPOINTMENT (OUTPATIENT)
Dept: OBGYN | Facility: HOSPITAL | Age: 30
End: 2020-06-29

## 2020-06-29 ENCOUNTER — RESULT REVIEW (OUTPATIENT)
Age: 30
End: 2020-06-29

## 2020-06-29 VITALS
SYSTOLIC BLOOD PRESSURE: 113 MMHG | WEIGHT: 128.53 LBS | HEIGHT: 61 IN | DIASTOLIC BLOOD PRESSURE: 77 MMHG | OXYGEN SATURATION: 100 % | RESPIRATION RATE: 16 BRPM | HEART RATE: 84 BPM | TEMPERATURE: 99 F

## 2020-06-29 VITALS
DIASTOLIC BLOOD PRESSURE: 80 MMHG | SYSTOLIC BLOOD PRESSURE: 106 MMHG | OXYGEN SATURATION: 100 % | TEMPERATURE: 97 F | RESPIRATION RATE: 16 BRPM | HEART RATE: 74 BPM

## 2020-06-29 DIAGNOSIS — N92.1 EXCESSIVE AND FREQUENT MENSTRUATION WITH IRREGULAR CYCLE: ICD-10-CM

## 2020-06-29 DIAGNOSIS — K08.409 PARTIAL LOSS OF TEETH, UNSPECIFIED CAUSE, UNSPECIFIED CLASS: Chronic | ICD-10-CM

## 2020-06-29 DIAGNOSIS — N94.6 DYSMENORRHEA, UNSPECIFIED: ICD-10-CM

## 2020-06-29 DIAGNOSIS — N83.299 OTHER OVARIAN CYST, UNSPECIFIED SIDE: ICD-10-CM

## 2020-06-29 LAB
BILIRUB UR QL STRIP: NORMAL
GLUCOSE UR-MCNC: NORMAL
HCG UR QL: 0.2 EU/DL
HCG UR QL: NEGATIVE
HGB UR QL STRIP.AUTO: NORMAL
KETONES UR-MCNC: NORMAL
LEUKOCYTE ESTERASE UR QL STRIP: NORMAL
NITRITE UR QL STRIP: NORMAL
PH UR STRIP: 8.5
PROT UR STRIP-MCNC: NORMAL
QUALITY CONTROL: YES
SP GR UR STRIP: 1.01

## 2020-06-29 PROCEDURE — 58662 LAPAROSCOPY EXCISE LESIONS: CPT

## 2020-06-29 PROCEDURE — 58662 LAPAROSCOPY EXCISE LESIONS: CPT | Mod: 80

## 2020-06-29 PROCEDURE — 88304 TISSUE EXAM BY PATHOLOGIST: CPT

## 2020-06-29 PROCEDURE — 58300 INSERT INTRAUTERINE DEVICE: CPT | Mod: 59

## 2020-06-29 PROCEDURE — 88304 TISSUE EXAM BY PATHOLOGIST: CPT | Mod: 26

## 2020-06-29 PROCEDURE — 58300 INSERT INTRAUTERINE DEVICE: CPT

## 2020-06-29 RX ORDER — ONDANSETRON 8 MG/1
4 TABLET, FILM COATED ORAL ONCE
Refills: 0 | Status: COMPLETED | OUTPATIENT
Start: 2020-06-29 | End: 2020-06-29

## 2020-06-29 RX ORDER — POLYETHYLENE GLYCOL 3350 17 G/17G
17 POWDER, FOR SOLUTION ORAL
Qty: 119 | Refills: 0
Start: 2020-06-29 | End: 2020-07-05

## 2020-06-29 RX ORDER — FENTANYL CITRATE 50 UG/ML
25 INJECTION INTRAVENOUS
Refills: 0 | Status: DISCONTINUED | OUTPATIENT
Start: 2020-06-29 | End: 2020-06-29

## 2020-06-29 RX ORDER — HYDROMORPHONE HYDROCHLORIDE 2 MG/ML
0.5 INJECTION INTRAMUSCULAR; INTRAVENOUS; SUBCUTANEOUS
Refills: 0 | Status: DISCONTINUED | OUTPATIENT
Start: 2020-06-29 | End: 2020-06-30

## 2020-06-29 RX ORDER — SODIUM CHLORIDE 9 MG/ML
1000 INJECTION, SOLUTION INTRAVENOUS
Refills: 0 | Status: DISCONTINUED | OUTPATIENT
Start: 2020-06-29 | End: 2020-06-30

## 2020-06-29 RX ORDER — CEFAZOLIN SODIUM 1 G
2000 VIAL (EA) INJECTION ONCE
Refills: 0 | Status: COMPLETED | OUTPATIENT
Start: 2020-06-29 | End: 2020-06-29

## 2020-06-29 RX ORDER — IBUPROFEN 200 MG
1 TABLET ORAL
Qty: 30 | Refills: 0
Start: 2020-06-29

## 2020-06-29 RX ADMIN — FENTANYL CITRATE 25 MICROGRAM(S): 50 INJECTION INTRAVENOUS at 20:01

## 2020-06-29 RX ADMIN — ONDANSETRON 4 MILLIGRAM(S): 8 TABLET, FILM COATED ORAL at 21:35

## 2020-06-29 RX ADMIN — FENTANYL CITRATE 25 MICROGRAM(S): 50 INJECTION INTRAVENOUS at 20:42

## 2020-06-29 RX ADMIN — FENTANYL CITRATE 25 MICROGRAM(S): 50 INJECTION INTRAVENOUS at 20:07

## 2020-06-29 RX ADMIN — FENTANYL CITRATE 25 MICROGRAM(S): 50 INJECTION INTRAVENOUS at 20:23

## 2020-06-29 RX ADMIN — Medication 100 MILLIGRAM(S): at 18:10

## 2020-06-29 NOTE — BRIEF OPERATIVE NOTE - NSICDXBRIEFPROCEDURE_GEN_ALL_CORE_FT
PROCEDURES:  Insertion of Mirena intrauterine device (IUD) 29-Jun-2020 19:46:30  Jim De La Cruz  Excision, cyst, paratubal, laparoscopic 29-Jun-2020 19:46:21  Jim De La Cruz  Laparoscopic fulguration of endometriosis 29-Jun-2020 19:46:02  Jim De La Cruz  Fulguration of endometriosis of ovary 29-Jun-2020 19:45:32  Jim De La Cruz  Laparoscopic ablation of endometrioma 29-Jun-2020 19:44:32  Jim De La Cruz

## 2020-06-29 NOTE — BRIEF OPERATIVE NOTE - NSICDXBRIEFPREOP_GEN_ALL_CORE_FT
PRE-OP DIAGNOSIS:  Ovarian cyst, bilateral 29-Jun-2020 19:47:06  Jim De La Cruz  Severe dysmenorrhea 29-Jun-2020 19:46:57  Jim De La Cruz  Female pelvic pain 29-Jun-2020 19:46:47  Jim De La Cruz

## 2020-06-29 NOTE — ASU DISCHARGE PLAN (ADULT/PEDIATRIC) - CALL YOUR DOCTOR IF YOU HAVE ANY OF THE FOLLOWING:
Nausea and vomiting that does not stop/Wound/Surgical Site with redness, or foul smelling discharge or pus/Unable to urinate/Numbness, tingling, color or temperature change to extremity/Fever greater than (need to indicate Fahrenheit or Celsius)

## 2020-06-29 NOTE — BRIEF OPERATIVE NOTE - OPERATION/FINDINGS
b/l endometriomae, chocolate cyst fluid extracted, adhesions to fossa navicularis  and fulgurated, excision of right paratubal cyst with torsion and hemorrhage, fulguration of endometriosis on ovarian surfaces, in the cyst cavities, posterior wall of the uterus at the union of the uterosacral ligaments, right peritoneal sidewall. Appendix was normal appearing, upper abdomen normal, bowel surfaces normal. Uterine cavity sounded to 7.5 cm. Mirena IUD easily placed. Surgiflow placed in the fossae bilateral and in the cyst cavities

## 2020-06-29 NOTE — ASU DISCHARGE PLAN (ADULT/PEDIATRIC) - PROCEDURE
Diagnostic laparoscopy, Fulguration of endometrial implants, Insertion of IUD device, Removal of right paratubal cyst, Lysis of adhesions

## 2020-06-29 NOTE — BRIEF OPERATIVE NOTE - NSICDXBRIEFPOSTOP_GEN_ALL_CORE_FT
POST-OP DIAGNOSIS:  Torsion of paratubal cyst 29-Jun-2020 19:48:07  Jim De La Cruz  Moderate endometriosis 29-Jun-2020 19:47:48  Jim De La Cruz  Endometrioma of ovary 29-Jun-2020 19:47:30  Jim De La Cruz

## 2020-06-29 NOTE — ASU DISCHARGE PLAN (ADULT/PEDIATRIC) - CARE PROVIDER_API CALL
Jim De La Cruz (MD)  Obstetrics and Gynecology  [address] [place] [address]  Phone: [phone]  Fax: [phone]  Follow Up Time: 2 weeks

## 2020-07-02 ENCOUNTER — APPOINTMENT (OUTPATIENT)
Dept: OBGYN | Facility: CLINIC | Age: 30
End: 2020-07-02
Payer: COMMERCIAL

## 2020-07-02 VITALS
DIASTOLIC BLOOD PRESSURE: 72 MMHG | SYSTOLIC BLOOD PRESSURE: 118 MMHG | BODY MASS INDEX: 24.55 KG/M2 | HEIGHT: 61 IN | WEIGHT: 130 LBS

## 2020-07-02 DIAGNOSIS — L25.9 UNSPECIFIED CONTACT DERMATITIS, UNSPECIFIED CAUSE: ICD-10-CM

## 2020-07-02 LAB — SURGICAL PATHOLOGY STUDY: SIGNIFICANT CHANGE UP

## 2020-07-02 PROCEDURE — 99213 OFFICE O/P EST LOW 20 MIN: CPT | Mod: 24

## 2020-07-02 RX ORDER — ETONOGESTREL AND ETHINYL ESTRADIOL 11.7; 2.7 MG/1; MG/1
0.12-0.015 INSERT, EXTENDED RELEASE VAGINAL
Qty: 1 | Refills: 3 | Status: DISCONTINUED | COMMUNITY
Start: 2020-04-06 | End: 2020-07-02

## 2020-07-03 PROBLEM — L25.9 CONTACT DERMATITIS: Status: ACTIVE | Noted: 2020-07-03

## 2020-07-03 PROBLEM — N92.1 EXCESSIVE AND FREQUENT MENSTRUATION WITH IRREGULAR CYCLE: Chronic | Status: ACTIVE | Noted: 2020-06-23

## 2020-07-03 PROBLEM — N94.6 DYSMENORRHEA, UNSPECIFIED: Chronic | Status: ACTIVE | Noted: 2020-06-23

## 2020-07-03 PROBLEM — N83.299 OTHER OVARIAN CYST, UNSPECIFIED SIDE: Chronic | Status: ACTIVE | Noted: 2020-06-23

## 2020-07-08 ENCOUNTER — APPOINTMENT (OUTPATIENT)
Dept: OBGYN | Facility: CLINIC | Age: 30
End: 2020-07-08
Payer: COMMERCIAL

## 2020-07-08 VITALS
DIASTOLIC BLOOD PRESSURE: 80 MMHG | HEIGHT: 61 IN | BODY MASS INDEX: 23.98 KG/M2 | SYSTOLIC BLOOD PRESSURE: 112 MMHG | WEIGHT: 127 LBS

## 2020-07-08 PROCEDURE — 99024 POSTOP FOLLOW-UP VISIT: CPT

## 2020-07-09 RX ORDER — MELATONIN 3 MG
TABLET ORAL
Refills: 0 | Status: ACTIVE | COMMUNITY

## 2020-07-09 RX ORDER — PNV NO.95/FERROUS FUM/FOLIC AC 28MG-0.8MG
TABLET ORAL
Refills: 0 | Status: ACTIVE | COMMUNITY

## 2020-07-09 NOTE — HISTORY OF PRESENT ILLNESS
[Good] : being in good health [Healthy Diet] : a healthy diet [Reproductive Age] : is of reproductive age [Menarche Age: ____] : age at menarche was [unfilled] [unknown] : the patient is unsure of the date of her LMP [Contraception] : uses contraception [IUD] : has an intrauterine device [Last Pap ___] : Last cervical pap smear was [unfilled] [Regular Exercise] : not exercising regularly [Weight Concerns] : no concerns with her weight [Menstrual Problems] : reports normal menses [Pregnancy History] : denies prior pregnancies [Sexually Active] : is not sexually active

## 2020-07-22 NOTE — PHYSICAL EXAM
[Awake] : awake [Alert] : alert [Tender] : non tender [Acute Distress] : no acute distress [Soft] : soft [Oriented x3] : oriented to person, place, and time [Depressed Mood] : not depressed [Distended] : not distended [CTAB] : CTAB [RRR, No Murmurs] : RRR, no murmurs

## 2020-09-09 ENCOUNTER — APPOINTMENT (OUTPATIENT)
Dept: OBGYN | Facility: CLINIC | Age: 30
End: 2020-09-09
Payer: COMMERCIAL

## 2020-09-09 ENCOUNTER — ASOB RESULT (OUTPATIENT)
Age: 30
End: 2020-09-09

## 2020-09-09 VITALS
HEIGHT: 61 IN | WEIGHT: 130 LBS | DIASTOLIC BLOOD PRESSURE: 60 MMHG | BODY MASS INDEX: 24.55 KG/M2 | SYSTOLIC BLOOD PRESSURE: 100 MMHG

## 2020-09-09 DIAGNOSIS — Z11.3 ENCOUNTER FOR SCREENING FOR INFECTIONS WITH A PREDOMINANTLY SEXUAL MODE OF TRANSMISSION: ICD-10-CM

## 2020-09-09 DIAGNOSIS — Z51.89 ENCOUNTER FOR OTHER SPECIFIED AFTERCARE: ICD-10-CM

## 2020-09-09 DIAGNOSIS — Z01.818 ENCOUNTER FOR OTHER PREPROCEDURAL EXAMINATION: ICD-10-CM

## 2020-09-09 PROCEDURE — 99213 OFFICE O/P EST LOW 20 MIN: CPT | Mod: 25,24

## 2020-09-09 PROCEDURE — 76830 TRANSVAGINAL US NON-OB: CPT

## 2020-09-09 PROCEDURE — 99024 POSTOP FOLLOW-UP VISIT: CPT

## 2020-09-09 PROCEDURE — 76376 3D RENDER W/INTRP POSTPROCES: CPT | Mod: 59

## 2020-09-09 RX ORDER — SILVER SULFADIAZINE 10 MG/G
1 CREAM TOPICAL
Qty: 2 | Refills: 1 | Status: COMPLETED | COMMUNITY
Start: 2020-07-03 | End: 2020-09-09

## 2020-09-09 RX ORDER — METHYLPREDNISOLONE 4 MG/1
4 TABLET ORAL
Qty: 1 | Refills: 0 | Status: COMPLETED | COMMUNITY
Start: 2020-07-03 | End: 2020-09-09

## 2020-09-09 RX ORDER — IBUPROFEN 600 MG/1
600 TABLET, FILM COATED ORAL
Refills: 0 | Status: COMPLETED | COMMUNITY
End: 2020-09-09

## 2020-09-11 NOTE — HISTORY OF PRESENT ILLNESS
[___ Month(s) Ago] : [unfilled] month(s) ago [Good] : being in good health [Last Pap ___] : Last cervical pap smear was [unfilled] [Reproductive Age] : is of reproductive age [Contraception] : uses contraception [IUD] : has an intrauterine device [Menarche Age: ____] : age at menarche was [unfilled] [Definite:  ___ (Date)] : the last menstrual period was [unfilled] [Sexually Active] : is sexually active [Male ___] : [unfilled] male [Menstrual Problems] : reports normal menses [Pregnancy History] : denies prior pregnancies [de-identified] : OSVALDO 6/29/20

## 2020-09-11 NOTE — END OF VISIT
[FreeTextEntry3] : I, Fern Chavira, solely acted as scribe for Dr. Jim De La Cruz on 09/09/2020.\par All medical entries made by the Scribe were at my, Dr. De La Cruz's direction and personally dictated by me on 09/09/2020. I have reviewed the chart and agree that the record accurately reflects my personal performance of the history, physical exam, assessment and plan. I have also personally directed, reviewed, and agreed with the chart.

## 2020-09-11 NOTE — PHYSICAL EXAM
[Alert] : alert [Awake] : awake [Oriented x3] : oriented to person, place, and time [Labia Majora] : labia major [Labia Minora] : labia minora [Normal] : clitoris [IUD String] : had an IUD string protruding out [Acute Distress] : no acute distress [Depressed Mood] : not depressed [Flat Affect] : affect not flat

## 2021-06-01 ENCOUNTER — APPOINTMENT (OUTPATIENT)
Dept: OBGYN | Facility: CLINIC | Age: 31
End: 2021-06-01
Payer: COMMERCIAL

## 2021-06-01 VITALS
WEIGHT: 125 LBS | HEIGHT: 61 IN | TEMPERATURE: 97.7 F | DIASTOLIC BLOOD PRESSURE: 60 MMHG | SYSTOLIC BLOOD PRESSURE: 98 MMHG | BODY MASS INDEX: 23.6 KG/M2

## 2021-06-01 DIAGNOSIS — Z23 ENCOUNTER FOR IMMUNIZATION: ICD-10-CM

## 2021-06-01 DIAGNOSIS — N94.6 DYSMENORRHEA, UNSPECIFIED: ICD-10-CM

## 2021-06-01 PROCEDURE — 99072 ADDL SUPL MATRL&STAF TM PHE: CPT

## 2021-06-01 PROCEDURE — 99395 PREV VISIT EST AGE 18-39: CPT

## 2021-06-10 NOTE — END OF VISIT
[FreeTextEntry3] : I, Zhane Lara solely acted as a scribe for Dr. Jim De La Cruz on 06/01/2021 . All medical entries made by the scribe were at my, Dr. De La Cruz's, direction and personally dictated by me on 06/01/2021 . I have reviewed the chart and agree that the record accurately reflects my personal performance of the history, physical exam, assessment and plan. I have also personally directed, reviewed, and agreed with the chart.

## 2021-06-10 NOTE — PHYSICAL EXAM
[Appropriately responsive] : appropriately responsive [Alert] : alert [No Acute Distress] : no acute distress [Soft] : soft [Non-tender] : non-tender [Non-distended] : non-distended [No HSM] : No HSM [No Lesions] : no lesions [No Mass] : no mass [Oriented x3] : oriented x3 [Examination Of The Breasts] : a normal appearance [No Masses] : no breast masses were palpable [Labia Majora] : normal [Labia Minora] : normal [IUD String] : an IUD string was noted [Normal] : normal [Uterine Adnexae] : normal [FreeTextEntry5] : I

## 2021-06-10 NOTE — DISCUSSION/SUMMARY
[FreeTextEntry1] : During this visit comprehensive counseling was given regarding the following concerns:\par \par 1. We discussed the need for proper nutrition and exercise. This includes cardiovascular and pelvic floor exercises\par \par 2. We discussed the importance of maintaining proper vaccination schedule and we discussed the utility of the flu vaccine and TDaP\par \par 3. We discussed the impact of chronic sun exposure and the risk for skin disease as a result. The benefits of a total body scan by a Dermatologist was reviewed.\par \par 4. We discussed the need for certain supplements for most people which include vitamin D3, calcium rich foods with limitation on calcium supplementation by tabular form to 600 mg by mouth daily. As part of a bone health program we recommend weight bearing exercises, and some limited sun exposure (10-15 minutes daily) to help convert Vitamin D to its active form.\par \par IUD string cut today per patient's request. \par \par Cervical pap done today. \par \par Advised patient to take Advil 600 MG every 6 hours to help with menorrhagia. Prescription for Ibuprofen 600 MG sent to pharmacy. Instructions given. \par \par All questions were answered. \par \par During this visit 15 minutes were spent face- to- face with greater than 50% of the time dedicated to counseling.

## 2021-06-10 NOTE — HISTORY OF PRESENT ILLNESS
[Patient reported PAP Smear was normal] : Patient reported PAP Smear was normal [Gonorrhea test offered] : Gonorrhea test offered [Chlamydia test offered] : Chlamydia test offered [HPV test offered] : HPV test offered [IUD] : has an intrauterine device [N] : Patient denies prior pregnancies [Ultrasound] : ultrasound [Menarche Age: ____] : age at menarche was [unfilled] [Currently Active] : currently active [Men] : men [Y] : Patient reports abnormal menses [Irregular Cycle Intervals] : are  irregular [Yes] : Patient has concerns regarding sex [TextBox_4] : Pt presents for an Annual. [PapSmeardate] : 05/18/2020 [GonorrheaDate] : 05/18/2020 [TextBox_63] : Negative [ChlamydiaDate] : 05/18/2020 [TextBox_68] : Negative [HPVDate] : 05/18/2020 [LMPDate] : 05/15/2021 [TextBox_78] : Negative [PGHxTotal] : 0 [TextBox_27] : 09/09/2020 [FreeTextEntry1] : Pain with sex [FreeTextEntry3] : IUD

## 2021-06-27 LAB — CYTOLOGY CVX/VAG DOC THIN PREP: NORMAL

## 2022-04-04 ENCOUNTER — NON-APPOINTMENT (OUTPATIENT)
Age: 32
End: 2022-04-04

## 2022-04-04 ENCOUNTER — APPOINTMENT (OUTPATIENT)
Dept: OBGYN | Facility: CLINIC | Age: 32
End: 2022-04-04
Payer: COMMERCIAL

## 2022-04-04 VITALS
BODY MASS INDEX: 23.98 KG/M2 | WEIGHT: 127 LBS | TEMPERATURE: 97.5 F | DIASTOLIC BLOOD PRESSURE: 82 MMHG | HEIGHT: 61 IN | SYSTOLIC BLOOD PRESSURE: 120 MMHG

## 2022-04-04 DIAGNOSIS — N90.7 VULVAR CYST: ICD-10-CM

## 2022-04-04 PROCEDURE — ZZZZZ: CPT

## 2022-04-04 NOTE — PLAN
[FreeTextEntry1] : Patient made aware and the edema is most likely related to the 8 mm subcutaneous cyst like structure near her clitoris that is extremely tender to touch and we reviewed the possibility of infection.  We reviewed the fact that the best way to treat the area would be incision and drainage and patient was unable to even tolerate any manipulation requesting antibiotics first and we discussed the issues with culture and sensitivities however patient states she is not able to tolerate the incision and drainage without preprocedure medication.  Patient was given dicloxacillin 500 mg 4 times a day and she denies any penicillin allergies and advised to return and she will apply EMLA and take 0.25 of Xanax 1 hour preprocedure.  Warning signs and precautions for more immediate ER attention were outlined.  I did advise she have this done with an MD given the clitoral location.  Call parameters for immediate attention reviewed and patient verbalized good understanding

## 2022-04-04 NOTE — HISTORY OF PRESENT ILLNESS
[IUD] : has an intrauterine device [Y] : Patient is sexually active [N] : Patient denies prior pregnancies [Menarche Age: ____] : age at menarche was [unfilled] [No] : Patient does not have concerns regarding sex [Currently Active] : currently active [PapSmeardate] : 06/01/21 [TextBox_31] : NEG [GonorrheaDate] : 05/18/20 [TextBox_63] : NEG [ChlamydiaDate] : 05/18/20 [TextBox_68] : NEG [HPVDate] : 05/18/20 [TextBox_78] : NEG [LMPDate] : 03/18/22 [PGHxTotal] : 0 [FreeTextEntry1] : 03/18/22

## 2022-04-04 NOTE — PHYSICAL EXAM
[Appropriately responsive] : appropriately responsive [Alert] : alert [No Acute Distress] : no acute distress [Oriented x3] : oriented x3 [Labia Majora] : normal on the left [Labia Majora Erythema Right] : erythema on the right [Labia Minora] : normal on the left [Labia Minora Erythema Right] : erythema on the right [Enlarged] : enlarged [Normal] : normal [FreeTextEntry2] : Edematous swelling with diffuse erythematous change clitoral williamson area towards right labia majora subcutaneous 8 mm papule noted sebaceous cyst most consistent differential, very difficult to examine as patient will not let me examine her well related to the pain and discomfort, area of erythema measures 1.5 inches x 1.5 inches of diffuse erythema over clitoral williamson, no ulceration no skin breakdown

## 2022-04-05 ENCOUNTER — NON-APPOINTMENT (OUTPATIENT)
Age: 32
End: 2022-04-05

## 2022-04-05 DIAGNOSIS — B37.9 CANDIDIASIS, UNSPECIFIED: ICD-10-CM

## 2022-04-06 ENCOUNTER — APPOINTMENT (OUTPATIENT)
Dept: OBGYN | Facility: CLINIC | Age: 32
End: 2022-04-06
Payer: COMMERCIAL

## 2022-04-06 ENCOUNTER — LABORATORY RESULT (OUTPATIENT)
Age: 32
End: 2022-04-06

## 2022-04-06 VITALS
DIASTOLIC BLOOD PRESSURE: 70 MMHG | SYSTOLIC BLOOD PRESSURE: 112 MMHG | BODY MASS INDEX: 23.98 KG/M2 | TEMPERATURE: 97.5 F | HEIGHT: 61 IN | WEIGHT: 127 LBS

## 2022-04-06 DIAGNOSIS — Z11.3 ENCOUNTER FOR SCREENING FOR INFECTIONS WITH A PREDOMINANTLY SEXUAL MODE OF TRANSMISSION: ICD-10-CM

## 2022-04-06 DIAGNOSIS — R30.0 DYSURIA: ICD-10-CM

## 2022-04-06 LAB
BILIRUB UR QL STRIP: NORMAL
GLUCOSE UR-MCNC: NORMAL
HCG UR QL: 0.2 EU/DL
HCG UR QL: NEGATIVE
HGB UR QL STRIP.AUTO: ABNORMAL
KETONES UR-MCNC: NORMAL
LEUKOCYTE ESTERASE UR QL STRIP: ABNORMAL
NITRITE UR QL STRIP: NORMAL
PH UR STRIP: 7
PROT UR STRIP-MCNC: NORMAL
QUALITY CONTROL: YES
SP GR UR STRIP: 1.01

## 2022-04-06 PROCEDURE — 81003 URINALYSIS AUTO W/O SCOPE: CPT | Mod: NC,QW

## 2022-04-06 PROCEDURE — 99214 OFFICE O/P EST MOD 30 MIN: CPT | Mod: 25

## 2022-04-06 PROCEDURE — 56405 I&D VULVA/PERINEAL ABSCESS: CPT

## 2022-04-06 PROCEDURE — 81025 URINE PREGNANCY TEST: CPT

## 2022-04-07 RX ORDER — DICLOXACILLIN SODIUM 500 MG/1
500 CAPSULE ORAL 4 TIMES DAILY
Qty: 28 | Refills: 0 | Status: DISCONTINUED | COMMUNITY
Start: 2022-04-04 | End: 2022-04-07

## 2022-04-07 NOTE — PHYSICAL EXAM
[Chaperone Present] : A chaperone was present in the examining room during all aspects of the physical examination [Appropriately responsive] : appropriately responsive [Alert] : alert [No Acute Distress] : no acute distress [Oriented x3] : oriented x3 [Normal] : normal external genitalia [FreeTextEntry1] : BRYAN ZHAO [FreeTextEntry2] : 2 cm clitoral/labial abscess with fluctuant and indurated areas. +Erythema. +Tender to palpation. +Edema of clitoral williamson.

## 2022-04-07 NOTE — END OF VISIT
[Time Spent: ___ minutes] : I have spent [unfilled] minutes of time on the encounter. [FreeTextEntry3] : I, Zhane Marco solely acted as a scribe for Dr. Kianna Zambrano on 04/06/2022 . All medical entries made by the scribe were at my, Dr. Zambrano's, direction and personally dictated by me on 04/06/2022 . I have reviewed the chart and agree that the record accurately reflects my personal performance of the history, physical exam, assessment and plan. I have also personally directed, reviewed, and agreed with the chart.

## 2022-04-07 NOTE — HISTORY OF PRESENT ILLNESS
[Y] : Patient uses contraception [IUD] : has an intrauterine device [N] : Patient denies prior pregnancies [Menarche Age: ____] : age at menarche was [unfilled] [No] : Patient does not have concerns regarding sex [Previously active] : previously active [TextBox_31] : NEG [PapSmeardate] : 06/01/21 [GonorrheaDate] : 05/18/20 [TextBox_63] : NEG [ChlamydiaDate] : 05/18/20 [TextBox_68] : NEG [HPVDate] : 05/18/20 [TextBox_78] : NEG [LMPDate] : 03/18/22 [de-identified] : KYLEENA [PGHxTotal] : 0 [FreeTextEntry1] : 03/18/22

## 2022-04-07 NOTE — DISCUSSION/SUMMARY
[FreeTextEntry1] : -Clitoral/labial abscess- Recently doubled in size with increasing pain. \par 1) Management options were discussed: 1) Expectant management with Keflex, 2) Surgical management with I&D and continuing Keflex. R/b/a of each option were discussed. Patient desires to proceed with I&D. Procedure details for I&D, risks, benefits and alternatives were discussed. Possible risks discussed  include but are not limited to pain, bleeding, infection, and injury to nearby structures. Consent was obtained. Please see procedure note above. Purulent drainage was expressed and cultured. Area was cleaned with sterile saline. Mild bleeding noted with excellent hemostasis obtained with direct pressure.\par 2) Recommended keeping area clean and dry. Recommended taking ibuprofen/Tylenol OTC PRN, sitz baths, and warm compresses PRN for pain. \par 3) Continue pelvic rest. Post-procedure expectations and call parameters were discussed.\par 4) Rx Keflex 500 mg q 6 hours prescribed x 7 days.\par \par -Dysuria- U/A POC significant for small leukocytes and trace microscopic hematuria. U/A and urine culture collected to r/o UTI.\par \par -Follow up in 5 days or PRN.\par \par All questions and concerns were discussed. Call parameters were discussed.

## 2022-04-07 NOTE — REVIEW OF SYSTEMS
[Patient Intake Form Reviewed] : Patient intake form was reviewed [FreeTextEntry8] : Labial cyst and pain

## 2022-04-08 LAB
APPEARANCE: ABNORMAL
BACTERIA: NEGATIVE
BILIRUBIN URINE: NEGATIVE
BLOOD URINE: NEGATIVE
COLOR: NORMAL
GLUCOSE QUALITATIVE U: NEGATIVE
HYALINE CASTS: 0 /LPF
KETONES URINE: NEGATIVE
LEUKOCYTE ESTERASE URINE: ABNORMAL
MICROSCOPIC-UA: NORMAL
NITRITE URINE: NEGATIVE
PH URINE: 7
PROTEIN URINE: NEGATIVE
RED BLOOD CELLS URINE: 2 /HPF
SPECIFIC GRAVITY URINE: 1.01
SQUAMOUS EPITHELIAL CELLS: 18 /HPF
UROBILINOGEN URINE: NORMAL
WHITE BLOOD CELLS URINE: 10 /HPF

## 2022-04-09 RX ORDER — FLUCONAZOLE 150 MG/1
150 TABLET ORAL
Qty: 2 | Refills: 0 | Status: DISCONTINUED | COMMUNITY
Start: 2022-04-05 | End: 2022-04-05

## 2022-04-11 ENCOUNTER — APPOINTMENT (OUTPATIENT)
Dept: OBGYN | Facility: CLINIC | Age: 32
End: 2022-04-11
Payer: COMMERCIAL

## 2022-04-11 VITALS
SYSTOLIC BLOOD PRESSURE: 120 MMHG | HEIGHT: 61 IN | BODY MASS INDEX: 23.6 KG/M2 | WEIGHT: 125 LBS | DIASTOLIC BLOOD PRESSURE: 70 MMHG

## 2022-04-11 DIAGNOSIS — N76.4 ABSCESS OF VULVA: ICD-10-CM

## 2022-04-11 DIAGNOSIS — N89.8 OTHER SPECIFIED NONINFLAMMATORY DISORDERS OF VAGINA: ICD-10-CM

## 2022-04-11 LAB
BACTERIA GENITAL AEROBE CULT: NORMAL
BACTERIA UR CULT: NORMAL
BILIRUB UR QL STRIP: NEGATIVE
GLUCOSE UR-MCNC: NEGATIVE
HCG UR QL: 0.2 EU/DL
HGB UR QL STRIP.AUTO: ABNORMAL
KETONES UR-MCNC: NEGATIVE
LEUKOCYTE ESTERASE UR QL STRIP: ABNORMAL
NITRITE UR QL STRIP: NEGATIVE
PH UR STRIP: 7.5
PROT UR STRIP-MCNC: NEGATIVE
SP GR UR STRIP: 1.01

## 2022-04-11 PROCEDURE — 81003 URINALYSIS AUTO W/O SCOPE: CPT | Mod: NC,QW

## 2022-04-11 PROCEDURE — 99024 POSTOP FOLLOW-UP VISIT: CPT

## 2022-04-12 PROBLEM — N76.4 VULVAR ABSCESS: Status: ACTIVE | Noted: 2022-04-06

## 2022-04-12 NOTE — HISTORY OF PRESENT ILLNESS
[Menarche Age: ____] : age at menarche was [unfilled] [No] : Patient does not have concerns regarding sex [FreeTextEntry1] : 3/16/22 [FreeTextEntry3] : IUD

## 2022-04-12 NOTE — DISCUSSION/SUMMARY
[FreeTextEntry1] : -Clitoral/labial abscess s/p I&D on 4/6/22- Improved on exam today.\par 1) Patient is doing well and reports having significant improvement. On exam, small clitoral/labial abscess noted, which is significantly decreased in size compared to prior exam (70% smaller). Healing well. No drainage or bleeding noted. Mild edema of clitoral williamson, however improved from prior exam. No erythema noted.\par 2) Genital culture from 4/6/22: Negative x 48 hours.\par 3) Continue Keflex qid.\par 4) Recommended keeping area clean and dry. Continue pelvic rest. Call parameters were discussed.\par \par -Vaginal itching- Likely physiologic vaginal discharge. Affirm collected to r/o vaginitis.\par \par -Follow up in 1 week.\par \par All questions and concerns were discussed.

## 2022-04-12 NOTE — PHYSICAL EXAM
[Chaperone Present] : A chaperone was present in the examining room during all aspects of the physical examination [Appropriately responsive] : appropriately responsive [Alert] : alert [No Acute Distress] : no acute distress [Oriented x3] : oriented x3 [Normal] : normal [Pink Rugae] : pink rugae [No Bleeding] : There was no active vaginal bleeding [FreeTextEntry1] : Small clitoral/labial abscess s/p I&D on 4/6/22- significantly decreased in size ~70% smaller. Healing well. No drainage or bleeding noted. Mild edema of clitoral williamson, however improved from prior exam. No erythema noted. [FreeTextEntry4] : Physiologic vaginal discharge noted.

## 2022-04-15 LAB
CANDIDA VAG CYTO: NOT DETECTED
G VAGINALIS+PREV SP MTYP VAG QL MICRO: NOT DETECTED
T VAGINALIS VAG QL WET PREP: NOT DETECTED

## 2022-04-18 ENCOUNTER — APPOINTMENT (OUTPATIENT)
Dept: OBGYN | Facility: CLINIC | Age: 32
End: 2022-04-18
Payer: COMMERCIAL

## 2022-04-18 VITALS
DIASTOLIC BLOOD PRESSURE: 76 MMHG | SYSTOLIC BLOOD PRESSURE: 114 MMHG | BODY MASS INDEX: 23.6 KG/M2 | HEIGHT: 61 IN | WEIGHT: 125 LBS

## 2022-04-18 DIAGNOSIS — N76.4 ABSCESS OF VULVA: ICD-10-CM

## 2022-04-18 PROCEDURE — 99213 OFFICE O/P EST LOW 20 MIN: CPT

## 2022-04-20 ENCOUNTER — APPOINTMENT (OUTPATIENT)
Dept: FAMILY MEDICINE | Facility: CLINIC | Age: 32
End: 2022-04-20
Payer: COMMERCIAL

## 2022-04-20 VITALS
HEIGHT: 61 IN | HEART RATE: 68 BPM | SYSTOLIC BLOOD PRESSURE: 114 MMHG | TEMPERATURE: 98.4 F | DIASTOLIC BLOOD PRESSURE: 70 MMHG | OXYGEN SATURATION: 99 % | WEIGHT: 125 LBS | BODY MASS INDEX: 23.6 KG/M2

## 2022-04-20 DIAGNOSIS — K21.9 GASTRO-ESOPHAGEAL REFLUX DISEASE W/OUT ESOPHAGITIS: ICD-10-CM

## 2022-04-20 DIAGNOSIS — Z13.220 ENCOUNTER FOR SCREENING FOR LIPOID DISORDERS: ICD-10-CM

## 2022-04-20 DIAGNOSIS — Z00.00 ENCOUNTER FOR GENERAL ADULT MEDICAL EXAMINATION W/OUT ABNORMAL FINDINGS: ICD-10-CM

## 2022-04-20 DIAGNOSIS — R14.0 ABDOMINAL DISTENSION (GASEOUS): ICD-10-CM

## 2022-04-20 DIAGNOSIS — Z11.59 ENCOUNTER FOR SCREENING FOR OTHER VIRAL DISEASES: ICD-10-CM

## 2022-04-20 DIAGNOSIS — Z13.21 ENCOUNTER FOR SCREENING FOR NUTRITIONAL DISORDER: ICD-10-CM

## 2022-04-20 DIAGNOSIS — R79.89 OTHER SPECIFIED ABNORMAL FINDINGS OF BLOOD CHEMISTRY: ICD-10-CM

## 2022-04-20 DIAGNOSIS — Z80.0 FAMILY HISTORY OF MALIGNANT NEOPLASM OF DIGESTIVE ORGANS: ICD-10-CM

## 2022-04-20 DIAGNOSIS — Z13.29 ENCOUNTER FOR SCREENING FOR OTHER SUSPECTED ENDOCRINE DISORDER: ICD-10-CM

## 2022-04-20 DIAGNOSIS — Z80.42 FAMILY HISTORY OF MALIGNANT NEOPLASM OF PROSTATE: ICD-10-CM

## 2022-04-20 DIAGNOSIS — Z13.1 ENCOUNTER FOR SCREENING FOR DIABETES MELLITUS: ICD-10-CM

## 2022-04-20 PROCEDURE — 99385 PREV VISIT NEW AGE 18-39: CPT | Mod: 25

## 2022-04-20 PROCEDURE — 36415 COLL VENOUS BLD VENIPUNCTURE: CPT

## 2022-04-20 RX ORDER — CEPHALEXIN 500 MG/1
500 TABLET ORAL EVERY 6 HOURS
Qty: 28 | Refills: 0 | Status: DISCONTINUED | COMMUNITY
Start: 2022-04-06 | End: 2022-04-20

## 2022-04-20 RX ORDER — LIDOCAINE HYDROCHLORIDE 20 MG/ML
2 SOLUTION ORAL; TOPICAL
Qty: 100 | Refills: 0 | Status: DISCONTINUED | COMMUNITY
Start: 2022-03-20

## 2022-04-20 RX ORDER — CEPHALEXIN 500 MG/1
500 CAPSULE ORAL
Qty: 28 | Refills: 0 | Status: DISCONTINUED | COMMUNITY
Start: 2022-04-09

## 2022-04-20 RX ORDER — TRIAMCINOLONE ACETONIDE 1 MG/G
0.1 CREAM TOPICAL
Qty: 454 | Refills: 0 | Status: DISCONTINUED | COMMUNITY
Start: 2022-03-01

## 2022-04-20 RX ORDER — HYDROXYZINE HYDROCHLORIDE 10 MG/1
10 TABLET ORAL
Qty: 10 | Refills: 0 | Status: DISCONTINUED | COMMUNITY
Start: 2022-03-01

## 2022-04-20 RX ORDER — IBUPROFEN 600 MG/1
600 TABLET, FILM COATED ORAL EVERY 6 HOURS
Qty: 60 | Refills: 2 | Status: DISCONTINUED | COMMUNITY
Start: 2021-06-01 | End: 2022-04-20

## 2022-04-20 RX ORDER — ALPRAZOLAM 0.25 MG/1
0.25 TABLET ORAL
Qty: 2 | Refills: 0 | Status: DISCONTINUED | COMMUNITY
Start: 2022-04-04 | End: 2022-04-20

## 2022-04-20 RX ORDER — LIDOCAINE AND PRILOCAINE 25; 25 MG/G; MG/G
2.5-2.5 CREAM TOPICAL
Qty: 1 | Refills: 0 | Status: DISCONTINUED | COMMUNITY
Start: 2022-04-04 | End: 2022-04-20

## 2022-04-20 RX ORDER — FLUCONAZOLE 150 MG/1
150 TABLET ORAL
Qty: 2 | Refills: 0 | Status: DISCONTINUED | COMMUNITY
Start: 2022-04-05 | End: 2022-04-20

## 2022-04-20 RX ORDER — PREDNISONE 20 MG/1
20 TABLET ORAL
Qty: 4 | Refills: 0 | Status: DISCONTINUED | COMMUNITY
Start: 2022-03-20

## 2022-04-20 RX ORDER — IBUPROFEN 800 MG/1
800 TABLET, FILM COATED ORAL
Qty: 8 | Refills: 0 | Status: DISCONTINUED | COMMUNITY
Start: 2022-04-05

## 2022-04-20 NOTE — HEALTH RISK ASSESSMENT
[Good] : ~his/her~  mood as  good [Never] : Never [Yes] : Yes [No] : In the past 12 months have you used drugs other than those required for medical reasons? No [No falls in past year] : Patient reported no falls in the past year [0] : 2) Feeling down, depressed, or hopeless: Not at all (0) [Patient reported PAP Smear was normal] : Patient reported PAP Smear was normal [With Significant Other] : lives with significant other [Employed] : employed [Graduate School] : graduate school [] :  [Sexually Active] : sexually active [Feels Safe at Home] : Feels safe at home [Fully functional (bathing, dressing, toileting, transferring, walking, feeding)] : Fully functional (bathing, dressing, toileting, transferring, walking, feeding) [Fully functional (using the telephone, shopping, preparing meals, housekeeping, doing laundry, using] : Fully functional and needs no help or supervision to perform IADLs (using the telephone, shopping, preparing meals, housekeeping, doing laundry, using transportation, managing medications and managing finances) [de-identified] : social [Reports changes in hearing] : Reports no changes in hearing [Reports changes in vision] : Reports no changes in vision [Reports changes in dental health] : Reports no changes in dental health [PapSmearDate] : 2021 [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

## 2022-04-20 NOTE — HISTORY OF PRESENT ILLNESS
[FreeTextEntry1] : new patient is here to establish care [de-identified] : Dr harjinder alex - last pcp - 3 yrs ago \par no chest pain, no sob, no cough, no fever, no dizziness, no abdominal pain, no n/v/d/c/melena/brbpr/hematuria/dysuria\par admits to t3 level being abnormal in the past would like alll thyroid labs done \par also admits to bloating with certain foods like gluten randomly \par

## 2022-04-26 LAB
25(OH)D3 SERPL-MCNC: 26.6 NG/ML
ALBUMIN SERPL ELPH-MCNC: 4.5 G/DL
ALP BLD-CCNC: 87 U/L
ALT SERPL-CCNC: 16 U/L
ANION GAP SERPL CALC-SCNC: 11 MMOL/L
APPEARANCE: CLEAR
AST SERPL-CCNC: 23 U/L
BACTERIA: NEGATIVE
BASOPHILS # BLD AUTO: 0.04 K/UL
BASOPHILS NFR BLD AUTO: 0.6 %
BILIRUB SERPL-MCNC: 0.4 MG/DL
BILIRUBIN URINE: NEGATIVE
BLOOD URINE: NEGATIVE
BUN SERPL-MCNC: 9 MG/DL
CALCIUM SERPL-MCNC: 9.2 MG/DL
CHLORIDE SERPL-SCNC: 105 MMOL/L
CHOLEST SERPL-MCNC: 118 MG/DL
CO2 SERPL-SCNC: 23 MMOL/L
COLOR: COLORLESS
COVID-19 NUCLEOCAPSID  GAM ANTIBODY INTERPRETATION: POSITIVE
COVID-19 SPIKE DOMAIN ANTIBODY INTERPRETATION: POSITIVE
CREAT SERPL-MCNC: 0.54 MG/DL
EGFR: 127 ML/MIN/1.73M2
ENDOMYSIUM IGA SER QL: NEGATIVE
ENDOMYSIUM IGA TITR SER: NORMAL
EOSINOPHIL # BLD AUTO: 0.14 K/UL
EOSINOPHIL NFR BLD AUTO: 2.3 %
ESTIMATED AVERAGE GLUCOSE: 108 MG/DL
GLIADIN IGA SER QL: <5 UNITS
GLIADIN IGG SER QL: 5.9 UNITS
GLIADIN PEPTIDE IGA SER-ACNC: NEGATIVE
GLIADIN PEPTIDE IGG SER-ACNC: NEGATIVE
GLUCOSE QUALITATIVE U: NEGATIVE
GLUCOSE SERPL-MCNC: 88 MG/DL
HBA1C MFR BLD HPLC: 5.4 %
HCT VFR BLD CALC: 40.7 %
HDLC SERPL-MCNC: 56 MG/DL
HGB BLD-MCNC: 13 G/DL
HYALINE CASTS: 0 /LPF
IGA SER QL IEP: 324 MG/DL
IMM GRANULOCYTES NFR BLD AUTO: 0.3 %
KETONES URINE: NORMAL
LDLC SERPL CALC-MCNC: 55 MG/DL
LEUKOCYTE ESTERASE URINE: NEGATIVE
LYMPHOCYTES # BLD AUTO: 2.16 K/UL
LYMPHOCYTES NFR BLD AUTO: 34.8 %
MAN DIFF?: NORMAL
MCHC RBC-ENTMCNC: 27.9 PG
MCHC RBC-ENTMCNC: 31.9 GM/DL
MCV RBC AUTO: 87.3 FL
MICROSCOPIC-UA: NORMAL
MONOCYTES # BLD AUTO: 0.39 K/UL
MONOCYTES NFR BLD AUTO: 6.3 %
NEUTROPHILS # BLD AUTO: 3.45 K/UL
NEUTROPHILS NFR BLD AUTO: 55.7 %
NITRITE URINE: NEGATIVE
NONHDLC SERPL-MCNC: 62 MG/DL
PH URINE: 6.5
PLATELET # BLD AUTO: 234 K/UL
POTASSIUM SERPL-SCNC: 3.5 MMOL/L
PROT SERPL-MCNC: 7.1 G/DL
PROTEIN URINE: NEGATIVE
RBC # BLD: 4.66 M/UL
RBC # FLD: 12.2 %
RED BLOOD CELLS URINE: 0 /HPF
SARS-COV-2 AB SERPL IA-ACNC: 121 U/ML
SARS-COV-2 AB SERPL QL IA: 44.7 INDEX
SODIUM SERPL-SCNC: 139 MMOL/L
SPECIFIC GRAVITY URINE: 1.01
SQUAMOUS EPITHELIAL CELLS: 1 /HPF
T3 SERPL-MCNC: 118 NG/DL
T3FREE SERPL-MCNC: 3.21 PG/ML
T4 FREE SERPL-MCNC: 1.2 NG/DL
T4 SERPL-MCNC: 7 UG/DL
THYROGLOB AB SERPL-ACNC: <20 IU/ML
THYROPEROXIDASE AB SERPL IA-ACNC: <10 IU/ML
TRIGL SERPL-MCNC: 34 MG/DL
TSH SERPL-ACNC: 1.41 UIU/ML
TSI ACT/NOR SER: <0.1 IU/L
TTG IGA SER IA-ACNC: 4.5 U/ML
TTG IGA SER-ACNC: ABNORMAL
TTG IGG SER IA-ACNC: 5.1 U/ML
TTG IGG SER IA-ACNC: NEGATIVE
UROBILINOGEN URINE: NORMAL
WBC # FLD AUTO: 6.2 K/UL
WHITE BLOOD CELLS URINE: 0 /HPF

## 2022-05-09 ENCOUNTER — APPOINTMENT (OUTPATIENT)
Dept: OBGYN | Facility: CLINIC | Age: 32
End: 2022-05-09
Payer: COMMERCIAL

## 2022-05-09 VITALS
DIASTOLIC BLOOD PRESSURE: 78 MMHG | HEIGHT: 61 IN | BODY MASS INDEX: 23.6 KG/M2 | SYSTOLIC BLOOD PRESSURE: 116 MMHG | WEIGHT: 125 LBS

## 2022-05-09 PROCEDURE — 99213 OFFICE O/P EST LOW 20 MIN: CPT

## 2022-05-10 PROBLEM — N76.4 LABIAL ABSCESS: Status: ACTIVE | Noted: 2022-05-10

## 2022-05-10 NOTE — HISTORY OF PRESENT ILLNESS
[IUD] : has an intrauterine device [Y] : Patient is sexually active [N] : Patient denies prior pregnancies [Menarche Age: ____] : age at menarche was [unfilled] [Currently Active] : currently active [PapSmeardate] : 6/1/21 [TextBox_31] : neg [GonorrheaDate] : 4/8/22 [TextBox_63] : neg [ChlamydiaDate] : 4/8/22 [TextBox_68] : neg [LMPDate] : 4/14/22 [de-identified] : sadia ( placed 6/2020) [PGHxTotal] : 0 [FreeTextEntry1] : 4/14/22

## 2022-05-10 NOTE — PHYSICAL EXAM
[Chaperone Present] : A chaperone was present in the examining room during all aspects of the physical examination [Appropriately responsive] : appropriately responsive [Alert] : alert [No Acute Distress] : no acute distress [Oriented x3] : oriented x3 [Normal] : normal external genitalia [Labia Majora] : normal [Labia Minora] : normal [FreeTextEntry1] : BRYAN SORENSEN

## 2022-05-10 NOTE — DISCUSSION/SUMMARY
[FreeTextEntry1] : -Clitoral/labial abscess s/p I&D on 4/6/22 and treatment with Keflex- Resolved. Normal appearing bilateral labia. Exam findings were discussed. She was re-assured.\par \par -Last annual GYN exam in 6/2021.\par \par -She is already scheduled for her annual GYN exam with Dr. Samuel in 6/2022.\par \par All questions and concerns were discussed.

## 2022-06-07 ENCOUNTER — APPOINTMENT (OUTPATIENT)
Dept: OBGYN | Facility: CLINIC | Age: 32
End: 2022-06-07

## 2022-06-07 VITALS
SYSTOLIC BLOOD PRESSURE: 120 MMHG | BODY MASS INDEX: 23.6 KG/M2 | HEIGHT: 61 IN | DIASTOLIC BLOOD PRESSURE: 66 MMHG | WEIGHT: 125 LBS

## 2022-06-07 DIAGNOSIS — Z01.411 ENCOUNTER FOR GYNECOLOGICAL EXAMINATION (GENERAL) (ROUTINE) WITH ABNORMAL FINDINGS: ICD-10-CM

## 2022-06-07 DIAGNOSIS — Z12.4 ENCOUNTER FOR SCREENING FOR MALIGNANT NEOPLASM OF CERVIX: ICD-10-CM

## 2022-06-07 PROCEDURE — 81025 URINE PREGNANCY TEST: CPT

## 2022-06-07 PROCEDURE — 99395 PREV VISIT EST AGE 18-39: CPT

## 2022-06-07 RX ORDER — OMEGA-3/DHA/EPA/FISH OIL 300-1000MG
CAPSULE ORAL
Refills: 0 | Status: DISCONTINUED | COMMUNITY
End: 2022-06-07

## 2022-06-07 RX ORDER — LEVONORGESTREL 52 MG/1
INTRAUTERINE DEVICE INTRAUTERINE
Refills: 0 | Status: DISCONTINUED | COMMUNITY
End: 2022-06-07

## 2022-06-09 ENCOUNTER — TRANSCRIPTION ENCOUNTER (OUTPATIENT)
Age: 32
End: 2022-06-09

## 2022-06-24 NOTE — PHYSICAL EXAM
[Chaperone Present] : A chaperone was present in the examining room during all aspects of the physical examination [Appropriately responsive] : appropriately responsive [Alert] : alert [No Acute Distress] : no acute distress [Oriented x3] : oriented x3 [Normal] : normal external genitalia [Labia Majora] : normal on the left [Labia Minora] : normal on the left [FreeTextEntry1] : BRYAN ZHAO [FreeTextEntry2] : 7 mm right labial cyst palpated at 11 o'clock. No drainage, bleeding, or erythema.

## 2022-06-24 NOTE — HISTORY OF PRESENT ILLNESS
[IUD] : has an intrauterine device [Y] : Patient is sexually active [N] : Patient denies prior pregnancies [Menarche Age: ____] : age at menarche was [unfilled] [No] : Patient does not have concerns regarding sex [PapSmeardate] : 6/01/21 [TextBox_31] : NEG [GonorrheaDate] : 4/06/22 [TextBox_63] : NEG [ChlamydiaDate] : 4/06/22 [TextBox_68] : NEG [TrichomonasDate] : 4/11/22 [TextBox_73] : NEG [HPVDate] : 5/18/20 [TextBox_78] : NEG [LMPDate] : 4/14/22 [de-identified] : KYLEENA [PGHxTotal] : 0 [FreeTextEntry1] : 4/14/22

## 2022-06-24 NOTE — DISCUSSION/SUMMARY
[FreeTextEntry1] : -Clitoral/labial abscess s/p I&D on 4/6/22- s/p Keflex treatment.\par 1) Patient is doing well. Clitoral/labial abscess is significantly improved and almost resolved on exam today. 7 mm right labial cyst noted. No erythema, drainage, or bleeding. Exam findings were discussed.\par 2) Will continue to monitor.\par \par -Follow up in 2-3 weeks to re-assess labia.\par \par All questions and concerns were discussed.

## 2022-07-05 NOTE — PROCEDURE
HR=63 bpm, ZBEJ=921/88 mmhg, FaC1=652.0 %, Resp=17 B/min, EtCO2=40 mmHg, Apnea=2 Seconds, Pain=0, Ulises=10, Bustillos=2 [I & D] : I&D [Time out performed] : Pre-procedure time out performed.  Patient's name, date of birth and procedure confirmed [Consent obtained] : Consent obtained [Culture sent] : culture sent [Size: ___cm] : Cyst is ~Vcm [____% Lidocaine w/Epi] : [unfilled]% Lidocaine with Epi [Purulent Fluid] : purulent fluid [Bloody Fluid] : bloody fluid [Tolerated Well] : The patient tolerated the procedure well [No Complications] : There were no complications [de-identified] : Genital culture sent [de-identified] : Clitoral/labial abscess [de-identified] : EMLA cream applied by patient at home. Betadine prep. [de-identified] : Betadine prep. 5 mm incision was made with an 11 scalpel blade on the right side of the clitoral/labial abscess (separate from clitoris). Purulent fluid expressed and cultured. Area was irrigated with sterile saline. Mild bleeding was noted from the incision site and excellent hemostasis obtained with direct pressure.

## 2022-07-17 LAB
CYTOLOGY CVX/VAG DOC THIN PREP: NORMAL
HCG UR QL: NEGATIVE
HPV HIGH+LOW RISK DNA PNL CVX: NOT DETECTED
QUALITY CONTROL: YES

## 2022-07-17 NOTE — HISTORY OF PRESENT ILLNESS
[IUD] : has an intrauterine device [Y] : Patient is sexually active [N] : Patient denies prior pregnancies [Menarche Age: ____] : age at menarche was [unfilled] [Currently Active] : currently active [PapSmeardate] : 6/01/21 [TextBox_31] : neg [GonorrheaDate] : 4/08/22 [TextBox_63] : neg [ChlamydiaDate] : 4/08/22 [TextBox_68] : neg [HPVDate] : 5/18/20 [TextBox_78] : neg [LMPDate] : 5/11/22 [de-identified] : mirena placed 6/2020 [PGHxTotal] : 0 [FreeTextEntry1] : 5/11/2022

## 2022-07-17 NOTE — PHYSICAL EXAM
[Chaperone Present] : A chaperone was present in the examining room during all aspects of the physical examination [Appropriately responsive] : appropriately responsive [Alert] : alert [No Acute Distress] : no acute distress [No Lymphadenopathy] : no lymphadenopathy [No Murmurs] : no murmurs [Soft] : soft [Non-tender] : non-tender [No Mass] : no mass [Oriented x3] : oriented x3 [Examination Of The Breasts] : a normal appearance [No Masses] : no breast masses were palpable [Labia Majora] : normal [Labia Minora] : normal [Normal] : normal [Uterine Adnexae] : normal [FreeTextEntry1] : Martha Garduno MA

## 2023-04-18 ENCOUNTER — APPOINTMENT (OUTPATIENT)
Dept: OBGYN | Facility: CLINIC | Age: 33
End: 2023-04-18
Payer: COMMERCIAL

## 2023-04-18 VITALS
DIASTOLIC BLOOD PRESSURE: 84 MMHG | HEIGHT: 61 IN | BODY MASS INDEX: 24.46 KG/M2 | SYSTOLIC BLOOD PRESSURE: 148 MMHG | WEIGHT: 129.56 LBS

## 2023-04-18 VITALS — DIASTOLIC BLOOD PRESSURE: 80 MMHG | SYSTOLIC BLOOD PRESSURE: 142 MMHG

## 2023-04-18 DIAGNOSIS — Z30.432 ENCOUNTER FOR REMOVAL OF INTRAUTERINE CONTRACEPTIVE DEVICE: ICD-10-CM

## 2023-04-18 PROCEDURE — 58301 REMOVE INTRAUTERINE DEVICE: CPT

## 2023-04-18 PROCEDURE — 81025 URINE PREGNANCY TEST: CPT

## 2023-04-23 LAB
HCG UR QL: NEGATIVE
QUALITY CONTROL: YES

## 2023-04-23 NOTE — DISCUSSION/SUMMARY
[FreeTextEntry1] : -IUD removal done today. Please see procedure note above.\par \par -Recommended using clear blue ovulation kits when she desires to conceive.\par \par -She will follow up in 7/2023 for her annual gynecology exam or as needed.\par \par All questions and concerns were addressed at today's visit.\par

## 2023-04-23 NOTE — HISTORY OF PRESENT ILLNESS
[IUD] : has an intrauterine device [N] : Patient denies prior pregnancies [Menarche Age: ____] : age at menarche was [unfilled] [Y] : Patient is sexually active [Currently Active] : currently active [Men] : men [PapSmeardate] : 6/7/22 [TextBox_31] : NEG [GonorrheaDate] : 4/6/22 [TextBox_63] : NEG [ChlamydiaDate] : 4/6/22 [TextBox_68] : NEG [HPVDate] : 6/7/22 [TextBox_78] : NEG [LMPDate] : 3/16/23 [de-identified] : OSVALDO 6/2020 [PGHxTotal] : 0 [FreeTextEntry1] : 3/16/23

## 2023-04-23 NOTE — END OF VISIT
[FreeTextEntry3] : I, Zhane Lara solely acted as a scribe for Dr. Jim De La Cruz on 04/18/2023 . All medical entries made by the scribe were at my, Dr. De La Cruz's, direction and personally dictated by me on 04/18/2023 . I have reviewed the chart and agree that the record accurately reflects my personal performance of the history, physical exam, assessment and plan. I have also personally directed, reviewed, and agreed with the chart.

## 2023-04-23 NOTE — PHYSICAL EXAM
[Chaperone Present] : A chaperone was present in the examining room during all aspects of the physical examination [Appropriately responsive] : appropriately responsive [Alert] : alert [No Acute Distress] : no acute distress [Oriented x3] : oriented x3 [Labia Majora] : normal [Labia Minora] : normal [No Bleeding] : There was no active vaginal bleeding [IUD String] : an IUD string was noted [Normal] : normal [Uterine Adnexae] : normal [FreeTextEntry1] : Zhane Lara [Soft] : soft [Non-tender] : non-tender

## 2023-04-23 NOTE — PROCEDURE
[IUD Removal] : intrauterine device (IUD) removal [Time out performed] : Pre-procedure time out performed.  Patient's name, date of birth and procedure confirmed. [Consent Obtained] : Consent obtained [Risks] : risks [Benefits] : benefits [Alternatives] : alternatives [Patient] : patient [Sent to Pathology] : specimen was placed in buffered formalin and sent for pathology [Tolerated Well] : Patient tolerated the procedure well [No Complications] : no complications [Heavy Vaginal Bleeding] : for heavy vaginal bleeding [Pelvic Pain] : for pelvic pain [Fertility Desired] : fertility desired [Speculum Placed] : speculum placed [___ Month(s)] : in [unfilled] month(s) [Strings Visualized] : strings visualized [Cultured] : specimen sent for cultures

## 2023-05-31 NOTE — ASU PREOP CHECKLIST - HEIGHT IN INCHES
A referral for OBGYN has been placed. Call [phone] to schedule an appointment.     Labs have been sent to our outside laboratory. Results should be back in 2-5 days. We will call you once results come back. Check [facility] MyChart tho for results during after hours.     - You must understand that you have received an Urgent Care treatment only and that you may be released before all of your medical problems are known or treated.   - You, the patient, will arrange for follow up care as instructed.   - If your condition worsens or fails to improve we recommend that you receive another evaluation at the ER immediately or contact your PCP to discuss your concerns or return here.   - Follow up with your PCP or specialty clinic as directed in the next 1-2 weeks if not improved or as needed.  You can call [phone] to schedule an appointment with the appropriate provider.    If your symptoms do not improve or worsen, go to the emergency room immediately.     1

## 2023-06-29 ENCOUNTER — APPOINTMENT (OUTPATIENT)
Dept: OBGYN | Facility: CLINIC | Age: 33
End: 2023-06-29
Payer: COMMERCIAL

## 2023-06-29 VITALS
HEIGHT: 61 IN | SYSTOLIC BLOOD PRESSURE: 117 MMHG | DIASTOLIC BLOOD PRESSURE: 78 MMHG | BODY MASS INDEX: 23.98 KG/M2 | WEIGHT: 127 LBS

## 2023-06-29 LAB — ACTINOMYCES CULTURE FOR IUD: NORMAL

## 2023-06-29 PROCEDURE — 99395 PREV VISIT EST AGE 18-39: CPT | Mod: 1L

## 2023-06-29 RX ORDER — TRANEXAMIC ACID 650 MG/1
650 TABLET ORAL 3 TIMES DAILY
Qty: 60 | Refills: 0 | Status: DISCONTINUED | COMMUNITY
Start: 2022-06-07 | End: 2023-06-29

## 2023-06-29 NOTE — HISTORY OF PRESENT ILLNESS
[N] : Patient denies prior pregnancies [Menarche Age: ____] : age at menarche was [unfilled] [Condoms] : uses condoms [Y] : Patient is sexually active [PapSmeardate] : 6/7/22 [TextBox_31] : neg [GonorrheaDate] : 4/6/22 [TextBox_63] : neg [ChlamydiaDate] : 4/6/22 [TextBox_68] : neg [HPVDate] : 6/7/22 [TextBox_78] : neg [LMPDate] : 6/23/23 [PGHxTotal] : 0 [FreeTextEntry1] : 6/23/23 [Currently Active] : currently active

## 2023-06-29 NOTE — PHYSICAL EXAM
[Chaperone Present] : A chaperone was present in the examining room during all aspects of the physical examination [FreeTextEntry1] : LUNA Suazo

## 2023-10-05 ENCOUNTER — TRANSCRIPTION ENCOUNTER (OUTPATIENT)
Age: 33
End: 2023-10-05

## 2023-10-12 NOTE — ASU PREOP CHECKLIST - DENTURES
10/12/2023         RE: Valencia Casiano  [address] [address]  [place] [address]        Dear Colleague,    Thank you for referring your patient, Valencia Casiano, to the [facility] AND [facility]. Please see a copy of my visit note below.    GI CLINIC VISIT    CC/REFERRING MD:  Charley Cassidy  REASON FOR CONSULTATION:   Charley Cassidy for constipation  Chief Complaint   Patient presents with    Cystic Fibrosis     CF follow up        ASSESSMENT/PLAN:    Chronic Idiopathic Constipation   Hx of Cystic Fibrosis     Patient with a history of CF who presents to us with concern for constipation, patient states a few months ago she was many times going a whole week without a bowel movement and this led to abdominal discomfort and bloating. Sh was eventually started on Miralax which did not seem to improve her symptoms, this prompted a gastrografin enema about a month ago, this worked well and she has since been maintained on Miralax.     She currently takes 1-2 scoops of Miralax daily and has been having 1-2 BMs daily for the most part. She reports improvement in her abdominal discomfort and bloating. She denies blood in stool or weight loss     She feels very well with her current Miralax regimen and daily BMs and has no new GI related concerns today.     Constipation is likely secondary to slow colonic transit as a result of her history of cystic fibrosis     Given that patient is doing very well on her current Miralax regimen and is having at least 1 -2 BMs daily, we will continue current regimen without changes today, I also advised that she can titrate her Miralax as needed, Ideally we will not want her to have more than 3 BMs daily.     If she unfortunately looses response to Miralax, we can consider other options like linaclotide, lubipristone etc in the future. Patient was in agreement with this plan.     Plan:   --Continue Miralax, recommend self titration as needed  ( discussed with the patient)   --If evidence of blood in stool despite more regular BMs, will consider colonoscopy given higher risk of colon cancer in CF patients.   --Patient reports occasionally  getting some effect from taking castor oil, we advised that it was okay for her to take as needed but will recmmend optimizing Miralax as discussed above.   --Patient with recently normal basic labs, will check TSH at her next lab draw.   --If miralax fails, would recommend starting linaclotide 145 mcg  --If linaclotide cannot be covered, would recommend lubiprostone 16 mcg  --miralax can be used alongside these medications.      Follow up in 6 months, sooner if needed.     Plan discussed with GI Staff Dr. Pagan and Dr Saldivar who are in agreement.     60 minutes spent on the date of the encounter performing chart review, history and exam, documentation and further activities as noted above.      Daniel Dickens MD  Gastroenterology Fellow  Division of Gastroenterology, Hepatology and Nutrition  [facility]  P:         HPI  21 year old patient with a history of CF who presents to us with concern for constipation, patient states a few months ago she was many times going a whole week without a bowel movement and this led to abdominal discomfort and bloating. Sh was eventually started on Miralax which did not seem to improve her symptoms, this prompted a gastrografin enema about a month ago, this worked well and she has since been maintained on Miralax.     Prior to her gastrografin enema, she reports hard stools and having to strain and also reports occasional blood in stool at that time. But she has not had anymore blood in stool since her gastrografin enema and has not had to strain as much as she used to    She currently takes 1-2 scoops of Miralax daily and has been having 1-2 BMs daily for the most part. She reports improvement in her abdominal discomfort and bloating. She denies blood in  stool or weight loss     She feels very well with her current Miralax regimen and daily BMs and has no new GI related concerns today.       ROS:    Per HPI    PROBLEM LIST  There are no problems to display for this patient.      PERTINENT PAST MEDICAL HISTORY:  Cystic fibrosis     PREVIOUS SURGERIES:  No past surgical history on file.    PREVIOUS ENDOSCOPY:  None    ALLERGIES:     Allergies   Allergen Reactions    Vancomycin Hives     Get's shoshana syndrome and itching: She tolerates it with slowing the infusion and premed with benadryl    Latex Itching     Latex tape- breaks out in hives        PERTINENT MEDICATIONS:    Current Outpatient Medications:     acetylcysteine 20 % (MUCOMYST) 200 MG/ML SOLN, Take 30 mLs by mouth 4 times daily, Disp: 240 mL, Rfl: 0    albuterol (PROAIR HFA/PROVENTIL HFA/VENTOLIN HFA) 108 (90 Base) MCG/ACT inhaler, Inhale 1-2 puffs into the lungs every 4 hours as needed for shortness of breath or wheezing, Disp: 18 g, Rfl: 3    albuterol (PROVENTIL) (2.5 MG/3ML) 0.083% neb solution, Take 1 vial (2.5 mg) by nebulization 4 times daily as needed for exacerbation, Disp: 270 mL, Rfl: 3    cetirizine (ZYRTEC) 10 MG tablet, Take 1 tablet (10 mg) by mouth daily, Disp: 90 tablet, Rfl: 3    elexacaftor-tezacaftor-ivacaftor & ivacaftor (TRIKAFTA) 100-50-75 & 150 MG tablet pack, Take 2 orange tablets in the morning and 1 light blue tablet in the evening. Swallow whole with fat-containing food., Disp: 84 tablet, Rfl: 5    fluticasone (FLONASE) 50 MCG/ACT nasal spray, Spray 2 sprays into both nostrils daily as needed for rhinitis or allergies, Disp: 16 g, Rfl: 11    lipase-protease-amylase (CREON) 11942-78822-714397 units CPEP per EC capsule, Take 4 capsules by mouth 3 times daily (with meals) and 4 capsules with snacks 3 times daily. 3 snacks/3 meals per day., Disp: 720 capsule, Rfl: 11    polyethylene glycol (MIRALAX) 17 GM/Dose powder, Take 17 g by mouth daily, Disp: 510 g, Rfl: 11    sodium chloride  inhalant 7 % NEBU neb solution, Take 4 mLs by nebulization up to four times daily for exacerbations., Disp: 720 mL, Rfl: 3    vitamin D2 (ERGOCALCIFEROL) 65160 units (1250 mcg) capsule, Take 1 capsule (50,000 Units) by mouth once a week, Disp: 52 capsule, Rfl: 0  No current facility-administered medications for this visit.    SOCIAL HISTORY:  Social History     Socioeconomic History    Marital status: Single     Spouse name: Not on file    Number of children: Not on file    Years of education: Not on file    Highest education level: Not on file   Occupational History    Not on file   Tobacco Use    Smoking status: Unknown    Smokeless tobacco: Not on file   Substance and Sexual Activity    Alcohol use: Not on file    Drug use: Not on file    Sexual activity: Not on file   Other Topics Concern    Not on file   Social History Narrative    Not on file     Social Determinants of Health     Financial Resource Strain: Not on file   Food Insecurity: Not on file   Transportation Needs: Not on file   Physical Activity: Not on file   Stress: Not on file   Social Connections: Not on file   Interpersonal Safety: Not on file   Housing Stability: Not on file       FAMILY HISTORY:  No family history on file.    Past/family/social history reviewed and no changes    PHYSICAL EXAMINATION:  Constitutional: aaox3, cooperative, pleasant, not dyspneic/diaphoretic, no acute distress  Vitals reviewed: /72   Pulse 74   SpO2 100%   Wt:   Wt Readings from Last 2 Encounters:   10/12/23 49.9 kg (110 lb 0.2 oz)      Eyes: Sclera anicteric/injected  Ears/nose/mouth/throat: Normal oropharynx without ulcers or exudate, mucus membranes moist, hearing intact  Neck: supple  CV: No edema  Respiratory: Unlabored breathing  Abd:  Nondistended, +bs, no hepatosplenomegaly, nontender, no peritoneal signs  Skin: warm, perfused, no jaundice  Psych: Normal affect  MSK: Normal gait      PERTINENT STUDIES:    External Order Results on 06/22/2023    Component Date Value Ref Range Status    Scan Lab Results (External) 06/22/2023 See Scanned Report (L)   Final            Attestation with edits by Lanette Pagan MD at 10/25/2023  9:11 PM:  I participated in a visit and discussed the management with the GI Fellow, Dr Dickens on 10/12/2023. I reviewed the note and there are no changes to the past medical, family or social history. I agree with the documented findings and plan of care as outlined. A total of 60 minutes spent on the date of the encounter doing chart review, history and limited exam, documentation and further activities as noted above.     Lanette Pagan MD  Gastroenterology     no

## 2023-10-19 ENCOUNTER — TRANSCRIPTION ENCOUNTER (OUTPATIENT)
Age: 33
End: 2023-10-19

## 2023-11-26 LAB
CYTOLOGY CVX/VAG DOC THIN PREP: NORMAL
HPV HIGH+LOW RISK DNA PNL CVX: NOT DETECTED

## 2023-12-12 ENCOUNTER — APPOINTMENT (OUTPATIENT)
Dept: OBGYN | Facility: CLINIC | Age: 33
End: 2023-12-12
Payer: COMMERCIAL

## 2023-12-12 VITALS
DIASTOLIC BLOOD PRESSURE: 81 MMHG | HEIGHT: 61 IN | WEIGHT: 126.13 LBS | BODY MASS INDEX: 23.81 KG/M2 | SYSTOLIC BLOOD PRESSURE: 125 MMHG

## 2023-12-12 PROCEDURE — 99214 OFFICE O/P EST MOD 30 MIN: CPT

## 2023-12-14 ENCOUNTER — APPOINTMENT (OUTPATIENT)
Dept: HUMAN REPRODUCTION | Facility: CLINIC | Age: 33
End: 2023-12-14
Payer: COMMERCIAL

## 2023-12-14 PROCEDURE — 99204 OFFICE O/P NEW MOD 45 MIN: CPT | Mod: 25

## 2023-12-14 PROCEDURE — 36415 COLL VENOUS BLD VENIPUNCTURE: CPT

## 2023-12-14 PROCEDURE — 76830 TRANSVAGINAL US NON-OB: CPT

## 2023-12-18 ENCOUNTER — TRANSCRIPTION ENCOUNTER (OUTPATIENT)
Age: 33
End: 2023-12-18

## 2023-12-20 ENCOUNTER — APPOINTMENT (OUTPATIENT)
Dept: HUMAN REPRODUCTION | Facility: CLINIC | Age: 33
End: 2023-12-20
Payer: COMMERCIAL

## 2023-12-20 PROCEDURE — 76857 US EXAM PELVIC LIMITED: CPT

## 2023-12-20 PROCEDURE — 36415 COLL VENOUS BLD VENIPUNCTURE: CPT

## 2023-12-20 PROCEDURE — 99213 OFFICE O/P EST LOW 20 MIN: CPT | Mod: 25

## 2023-12-21 NOTE — DISCUSSION/SUMMARY
[FreeTextEntry1] : -Fertility consult- 1) The definition of infertility was reviewed, as well as optimal sexual intercourse practices (i.e coitus q 1-2 days to avoid low sperm count) that may result in an increased chance of conceiving. She will continue use of ovulation kits OTC and LH testing strips.  2) The utilization and benefits of a hysterosalpingogram discussed. Will consider.  3) Recommended that she follow up with Dr. Letha BYRNES. Business card provided. Semen analysis kit provided.  3) Recommended following up with Dr. Jaime Baugh for an acupuncture consultation regarding infertility.    -She will follow up in or as needed. All questions and concerns were addressed at today's visit. During this visit 30 minutes were spent face-to-face with greater than 50% of this time dedicated to counseling.

## 2023-12-21 NOTE — END OF VISIT
[FreeTextEntry3] : I, Zhane Lara solely acted as a scribe for Dr. Jim De La Cruz on 12/12/2023 . All medical entries made by the scribe were at my, Dr. De La Cruz's, direction and personally dictated by me on 12/12/2023 . I have reviewed the chart and agree that the record accurately reflects my personal performance of the history, physical exam, assessment and plan. I have also personally directed, reviewed, and agreed with the chart.

## 2023-12-21 NOTE — HISTORY OF PRESENT ILLNESS
[PapSmeardate] : 6/29/23 [TextBox_31] : neg [HPVDate] : 6/29/23 [TextBox_78] : neg [LMPDate] : 12/10/23 [PGHxTotal] : 0 [FreeTextEntry1] : 12/10/23

## 2023-12-27 ENCOUNTER — APPOINTMENT (OUTPATIENT)
Dept: HUMAN REPRODUCTION | Facility: CLINIC | Age: 33
End: 2023-12-27
Payer: COMMERCIAL

## 2023-12-27 PROCEDURE — 36415 COLL VENOUS BLD VENIPUNCTURE: CPT

## 2023-12-27 PROCEDURE — 76857 US EXAM PELVIC LIMITED: CPT

## 2023-12-27 PROCEDURE — 99213 OFFICE O/P EST LOW 20 MIN: CPT | Mod: 25

## 2024-01-04 ENCOUNTER — APPOINTMENT (OUTPATIENT)
Dept: HUMAN REPRODUCTION | Facility: CLINIC | Age: 34
End: 2024-01-04
Payer: COMMERCIAL

## 2024-01-04 PROCEDURE — 99215 OFFICE O/P EST HI 40 MIN: CPT | Mod: 95

## 2024-01-10 ENCOUNTER — APPOINTMENT (OUTPATIENT)
Dept: HUMAN REPRODUCTION | Facility: CLINIC | Age: 34
End: 2024-01-10
Payer: COMMERCIAL

## 2024-01-10 PROCEDURE — S4042: CPT

## 2024-01-10 PROCEDURE — 76830 TRANSVAGINAL US NON-OB: CPT

## 2024-01-10 PROCEDURE — 36415 COLL VENOUS BLD VENIPUNCTURE: CPT

## 2024-01-10 PROCEDURE — 99214 OFFICE O/P EST MOD 30 MIN: CPT | Mod: 25

## 2024-01-17 ENCOUNTER — APPOINTMENT (OUTPATIENT)
Dept: HUMAN REPRODUCTION | Facility: CLINIC | Age: 34
End: 2024-01-17
Payer: COMMERCIAL

## 2024-01-17 PROCEDURE — 76857 US EXAM PELVIC LIMITED: CPT

## 2024-01-17 PROCEDURE — 99213 OFFICE O/P EST LOW 20 MIN: CPT | Mod: 25

## 2024-02-07 ENCOUNTER — APPOINTMENT (OUTPATIENT)
Dept: HUMAN REPRODUCTION | Facility: CLINIC | Age: 34
End: 2024-02-07
Payer: COMMERCIAL

## 2024-02-07 PROCEDURE — S4042: CPT

## 2024-02-07 PROCEDURE — 76830 TRANSVAGINAL US NON-OB: CPT

## 2024-02-07 PROCEDURE — 36415 COLL VENOUS BLD VENIPUNCTURE: CPT

## 2024-02-07 PROCEDURE — 99213 OFFICE O/P EST LOW 20 MIN: CPT | Mod: 25

## 2024-02-16 ENCOUNTER — APPOINTMENT (OUTPATIENT)
Dept: HUMAN REPRODUCTION | Facility: CLINIC | Age: 34
End: 2024-02-16
Payer: COMMERCIAL

## 2024-02-16 PROCEDURE — 76857 US EXAM PELVIC LIMITED: CPT

## 2024-02-16 PROCEDURE — 99213 OFFICE O/P EST LOW 20 MIN: CPT | Mod: 25

## 2024-02-23 ENCOUNTER — APPOINTMENT (OUTPATIENT)
Dept: HUMAN REPRODUCTION | Facility: CLINIC | Age: 34
End: 2024-02-23
Payer: COMMERCIAL

## 2024-02-23 PROCEDURE — 36415 COLL VENOUS BLD VENIPUNCTURE: CPT

## 2024-02-23 PROCEDURE — 99213 OFFICE O/P EST LOW 20 MIN: CPT | Mod: 25

## 2024-02-23 PROCEDURE — 76857 US EXAM PELVIC LIMITED: CPT

## 2024-03-06 ENCOUNTER — APPOINTMENT (OUTPATIENT)
Dept: HUMAN REPRODUCTION | Facility: CLINIC | Age: 34
End: 2024-03-06
Payer: COMMERCIAL

## 2024-03-06 PROCEDURE — 99214 OFFICE O/P EST MOD 30 MIN: CPT | Mod: 25

## 2024-03-06 PROCEDURE — 76830 TRANSVAGINAL US NON-OB: CPT

## 2024-03-06 PROCEDURE — S4042: CPT

## 2024-03-06 PROCEDURE — 36415 COLL VENOUS BLD VENIPUNCTURE: CPT

## 2024-03-12 ENCOUNTER — APPOINTMENT (OUTPATIENT)
Dept: HUMAN REPRODUCTION | Facility: CLINIC | Age: 34
End: 2024-03-12

## 2024-03-13 ENCOUNTER — APPOINTMENT (OUTPATIENT)
Dept: HUMAN REPRODUCTION | Facility: CLINIC | Age: 34
End: 2024-03-13
Payer: COMMERCIAL

## 2024-03-13 PROCEDURE — 76857 US EXAM PELVIC LIMITED: CPT

## 2024-03-13 PROCEDURE — 99214 OFFICE O/P EST MOD 30 MIN: CPT | Mod: 25

## 2024-04-01 ENCOUNTER — APPOINTMENT (OUTPATIENT)
Dept: HUMAN REPRODUCTION | Facility: CLINIC | Age: 34
End: 2024-04-01
Payer: COMMERCIAL

## 2024-04-01 PROCEDURE — 36415 COLL VENOUS BLD VENIPUNCTURE: CPT

## 2024-04-05 ENCOUNTER — APPOINTMENT (OUTPATIENT)
Dept: HUMAN REPRODUCTION | Facility: CLINIC | Age: 34
End: 2024-04-05
Payer: COMMERCIAL

## 2024-04-05 PROCEDURE — 99213 OFFICE O/P EST LOW 20 MIN: CPT | Mod: 25

## 2024-04-05 PROCEDURE — 76830 TRANSVAGINAL US NON-OB: CPT

## 2024-04-05 PROCEDURE — S4042: CPT

## 2024-04-05 PROCEDURE — 36415 COLL VENOUS BLD VENIPUNCTURE: CPT

## 2024-04-12 ENCOUNTER — APPOINTMENT (OUTPATIENT)
Dept: HUMAN REPRODUCTION | Facility: CLINIC | Age: 34
End: 2024-04-12
Payer: COMMERCIAL

## 2024-04-12 PROCEDURE — 76857 US EXAM PELVIC LIMITED: CPT

## 2024-04-12 PROCEDURE — 99459 PELVIC EXAMINATION: CPT

## 2024-04-12 PROCEDURE — 99213 OFFICE O/P EST LOW 20 MIN: CPT | Mod: 25

## 2024-04-16 ENCOUNTER — APPOINTMENT (OUTPATIENT)
Dept: HUMAN REPRODUCTION | Facility: CLINIC | Age: 34
End: 2024-04-16
Payer: COMMERCIAL

## 2024-04-16 PROCEDURE — 36415 COLL VENOUS BLD VENIPUNCTURE: CPT

## 2024-04-16 PROCEDURE — 99213 OFFICE O/P EST LOW 20 MIN: CPT | Mod: 25

## 2024-04-16 PROCEDURE — 76857 US EXAM PELVIC LIMITED: CPT

## 2024-04-29 ENCOUNTER — APPOINTMENT (OUTPATIENT)
Dept: HUMAN REPRODUCTION | Facility: CLINIC | Age: 34
End: 2024-04-29
Payer: COMMERCIAL

## 2024-04-29 PROCEDURE — 99215 OFFICE O/P EST HI 40 MIN: CPT

## 2024-05-01 ENCOUNTER — APPOINTMENT (OUTPATIENT)
Dept: HUMAN REPRODUCTION | Facility: CLINIC | Age: 34
End: 2024-05-01
Payer: COMMERCIAL

## 2024-05-01 PROCEDURE — 36415 COLL VENOUS BLD VENIPUNCTURE: CPT

## 2024-05-07 ENCOUNTER — APPOINTMENT (OUTPATIENT)
Dept: HUMAN REPRODUCTION | Facility: CLINIC | Age: 34
End: 2024-05-07
Payer: COMMERCIAL

## 2024-05-07 PROCEDURE — 76830 TRANSVAGINAL US NON-OB: CPT

## 2024-05-07 PROCEDURE — 99214 OFFICE O/P EST MOD 30 MIN: CPT | Mod: 25

## 2024-05-15 ENCOUNTER — APPOINTMENT (OUTPATIENT)
Dept: HUMAN REPRODUCTION | Facility: CLINIC | Age: 34
End: 2024-05-15
Payer: COMMERCIAL

## 2024-05-15 PROCEDURE — 99213 OFFICE O/P EST LOW 20 MIN: CPT | Mod: 25

## 2024-05-15 PROCEDURE — 36415 COLL VENOUS BLD VENIPUNCTURE: CPT

## 2024-05-15 PROCEDURE — 76857 US EXAM PELVIC LIMITED: CPT

## 2024-05-22 ENCOUNTER — APPOINTMENT (OUTPATIENT)
Dept: HUMAN REPRODUCTION | Facility: CLINIC | Age: 34
End: 2024-05-22
Payer: COMMERCIAL

## 2024-05-22 PROCEDURE — 36415 COLL VENOUS BLD VENIPUNCTURE: CPT

## 2024-05-24 ENCOUNTER — APPOINTMENT (OUTPATIENT)
Dept: HUMAN REPRODUCTION | Facility: CLINIC | Age: 34
End: 2024-05-24
Payer: COMMERCIAL

## 2024-05-24 PROCEDURE — 58100 BIOPSY OF UTERUS LINING: CPT

## 2024-06-18 ENCOUNTER — APPOINTMENT (OUTPATIENT)
Dept: OBGYN | Facility: CLINIC | Age: 34
End: 2024-06-18

## 2024-06-18 VITALS
HEIGHT: 61 IN | BODY MASS INDEX: 24.78 KG/M2 | SYSTOLIC BLOOD PRESSURE: 124 MMHG | WEIGHT: 131.25 LBS | DIASTOLIC BLOOD PRESSURE: 81 MMHG

## 2024-06-18 PROCEDURE — 99214 OFFICE O/P EST MOD 30 MIN: CPT

## 2024-06-18 RX ORDER — FAMOTIDINE 40 MG/1
TABLET, FILM COATED ORAL
Refills: 0 | Status: ACTIVE | COMMUNITY

## 2024-06-18 RX ORDER — SENNOSIDES 8.6 MG
TABLET ORAL
Refills: 0 | Status: ACTIVE | COMMUNITY

## 2024-06-18 RX ORDER — PRENATAL VIT 49/IRON FUM/FOLIC 6.75-0.2MG
TABLET ORAL
Refills: 0 | Status: ACTIVE | COMMUNITY

## 2024-06-18 NOTE — HISTORY OF PRESENT ILLNESS
[Y] : Patient is sexually active [N] : Patient denies prior pregnancies [Menarche Age: ____] : age at menarche was [unfilled] [Currently Active] : currently active [Men] : men [PapSmeardate] : 6/29/23 [TextBox_31] : neg [GonorrheaDate] : 4/6/22 [TextBox_63] : neg [ChlamydiaDate] : 4/6/22 [TextBox_68] : neg [HPVDate] : 6/29/23 [TextBox_78] : neg [LMPDate] : 5/31/24 [FreeTextEntry1] : 5/31/24

## 2024-06-20 ENCOUNTER — TRANSCRIPTION ENCOUNTER (OUTPATIENT)
Age: 34
End: 2024-06-20

## 2024-06-20 DIAGNOSIS — N80.9 ENDOMETRIOSIS, UNSPECIFIED: ICD-10-CM

## 2024-07-02 ENCOUNTER — APPOINTMENT (OUTPATIENT)
Dept: MRI IMAGING | Facility: CLINIC | Age: 34
End: 2024-07-02

## 2024-07-02 ENCOUNTER — OUTPATIENT (OUTPATIENT)
Dept: OUTPATIENT SERVICES | Facility: HOSPITAL | Age: 34
LOS: 1 days | End: 2024-07-02
Payer: COMMERCIAL

## 2024-07-02 DIAGNOSIS — N93.9 ABNORMAL UTERINE AND VAGINAL BLEEDING, UNSPECIFIED: ICD-10-CM

## 2024-07-02 DIAGNOSIS — N80.9 ENDOMETRIOSIS, UNSPECIFIED: ICD-10-CM

## 2024-07-02 DIAGNOSIS — N83.299 OTHER OVARIAN CYST, UNSPECIFIED SIDE: ICD-10-CM

## 2024-07-02 DIAGNOSIS — K08.409 PARTIAL LOSS OF TEETH, UNSPECIFIED CAUSE, UNSPECIFIED CLASS: Chronic | ICD-10-CM

## 2024-07-02 PROCEDURE — 72197 MRI PELVIS W/O & W/DYE: CPT | Mod: 26

## 2024-07-02 PROCEDURE — A9585: CPT

## 2024-07-02 PROCEDURE — 72197 MRI PELVIS W/O & W/DYE: CPT

## 2024-07-03 ENCOUNTER — APPOINTMENT (OUTPATIENT)
Dept: OBGYN | Facility: CLINIC | Age: 34
End: 2024-07-03
Payer: COMMERCIAL

## 2024-07-03 VITALS
DIASTOLIC BLOOD PRESSURE: 84 MMHG | HEIGHT: 61 IN | BODY MASS INDEX: 24.82 KG/M2 | WEIGHT: 131.44 LBS | SYSTOLIC BLOOD PRESSURE: 118 MMHG

## 2024-07-03 DIAGNOSIS — Z12.4 ENCOUNTER FOR SCREENING FOR MALIGNANT NEOPLASM OF CERVIX: ICD-10-CM

## 2024-07-03 DIAGNOSIS — N80.9 ENDOMETRIOSIS, UNSPECIFIED: ICD-10-CM

## 2024-07-03 PROCEDURE — 99395 PREV VISIT EST AGE 18-39: CPT

## 2024-07-08 LAB — HPV HIGH+LOW RISK DNA PNL CVX: NOT DETECTED

## 2024-07-09 LAB — CYTOLOGY CVX/VAG DOC THIN PREP: NORMAL

## 2024-07-18 ENCOUNTER — TRANSCRIPTION ENCOUNTER (OUTPATIENT)
Age: 34
End: 2024-07-18

## 2025-01-28 ENCOUNTER — TRANSCRIPTION ENCOUNTER (OUTPATIENT)
Age: 35
End: 2025-01-28

## 2025-01-28 DIAGNOSIS — Z34.90 ENCOUNTER FOR SUPERVISION OF NORMAL PREGNANCY, UNSPECIFIED, UNSPECIFIED TRIMESTER: ICD-10-CM

## 2025-01-28 RX ORDER — ERGOCALCIFEROL (VITAMIN D2) 10 MCG
27-0.8 TABLET ORAL DAILY
Qty: 90 | Refills: 0 | Status: ACTIVE | COMMUNITY
Start: 2025-01-28 | End: 1900-01-01

## 2025-01-29 ENCOUNTER — TRANSCRIPTION ENCOUNTER (OUTPATIENT)
Age: 35
End: 2025-01-29

## 2025-01-30 ENCOUNTER — TRANSCRIPTION ENCOUNTER (OUTPATIENT)
Age: 35
End: 2025-01-30

## 2025-02-17 ENCOUNTER — NON-APPOINTMENT (OUTPATIENT)
Age: 35
End: 2025-02-17

## 2025-02-20 ENCOUNTER — TRANSCRIPTION ENCOUNTER (OUTPATIENT)
Age: 35
End: 2025-02-20

## 2025-02-26 ENCOUNTER — ASOB RESULT (OUTPATIENT)
Age: 35
End: 2025-02-26

## 2025-02-26 ENCOUNTER — LABORATORY RESULT (OUTPATIENT)
Age: 35
End: 2025-02-26

## 2025-02-26 ENCOUNTER — APPOINTMENT (OUTPATIENT)
Dept: OBGYN | Facility: CLINIC | Age: 35
End: 2025-02-26

## 2025-02-26 ENCOUNTER — APPOINTMENT (OUTPATIENT)
Dept: ANTEPARTUM | Facility: CLINIC | Age: 35
End: 2025-02-26
Payer: COMMERCIAL

## 2025-02-26 VITALS
BODY MASS INDEX: 25.55 KG/M2 | SYSTOLIC BLOOD PRESSURE: 111 MMHG | HEIGHT: 61 IN | DIASTOLIC BLOOD PRESSURE: 74 MMHG | WEIGHT: 135.31 LBS

## 2025-02-26 LAB
APPEARANCE: CLEAR
BILIRUBIN URINE: NEGATIVE
BLOOD URINE: NEGATIVE
COLOR: YELLOW
GLUCOSE QUALITATIVE U: NEGATIVE
HCG UR QL: POSITIVE
KETONES URINE: NEGATIVE
LEUKOCYTE ESTERASE URINE: ABNORMAL
NITRITE URINE: NEGATIVE
PH URINE: 7
PROTEIN URINE: NEGATIVE
QUALITY CONTROL: YES
SPECIFIC GRAVITY URINE: 1.01
UROBILINOGEN URINE: 0.2 (ref 0.2–?)

## 2025-02-26 PROCEDURE — 0500F INITIAL PRENATAL CARE VISIT: CPT

## 2025-02-26 PROCEDURE — 81025 URINE PREGNANCY TEST: CPT

## 2025-02-26 PROCEDURE — 76817 TRANSVAGINAL US OBSTETRIC: CPT

## 2025-02-26 PROCEDURE — 36415 COLL VENOUS BLD VENIPUNCTURE: CPT

## 2025-02-26 RX ORDER — PSEUDOEPHEDRINE HCL 30 MG
27-0.8 TABLET ORAL DAILY
Qty: 90 | Refills: 3 | Status: ACTIVE | COMMUNITY
Start: 2025-02-26 | End: 1900-01-01

## 2025-02-27 LAB
ABORH: NORMAL
ANTIBODY SCREEN: NORMAL
B19V IGG SER QL IA: 5.2 INDEX
B19V IGG+IGM SER-IMP: NORMAL
B19V IGG+IGM SER-IMP: POSITIVE
B19V IGM FLD-ACNC: 0.84 INDEX
B19V IGM SER-ACNC: NEGATIVE
CMV IGG SERPL QL: <0.2 U/ML
CMV IGG SERPL-IMP: NEGATIVE
CMV IGM SERPL QL: <8 AU/ML
CMV IGM SERPL QL: NEGATIVE
ESTIMATED AVERAGE GLUCOSE: 114 MG/DL
HBA1C MFR BLD HPLC: 5.6 %
HCT VFR BLD CALC: 33.6 %
HGB BLD-MCNC: 11.7 G/DL
MCHC RBC-ENTMCNC: 28.7 PG
MCHC RBC-ENTMCNC: 34.8 G/DL
MCV RBC AUTO: 82.4 FL
MEV IGG FLD QL IA: 163 AU/ML
MEV IGG+IGM SER-IMP: POSITIVE
PLATELET # BLD AUTO: 224 K/UL
RBC # BLD: 4.08 M/UL
RBC # FLD: 12.4 %
RUBV IGG FLD-ACNC: 12.4 INDEX
RUBV IGG SER-IMP: POSITIVE
T GONDII AB SER-IMP: NEGATIVE
T GONDII IGM SER QL: <3 AU/ML
T PALLIDUM AB SER QL IA: NEGATIVE
TSH SERPL-ACNC: 0.77 UIU/ML
VZV AB TITR SER: NEGATIVE
VZV IGG SER IF-ACNC: 0.73 S/CO
WBC # FLD AUTO: 7.15 K/UL

## 2025-02-28 LAB
C TRACH RRNA SPEC QL NAA+PROBE: NOT DETECTED
HAV IGM SER QL: NONREACTIVE
HBV CORE IGM SER QL: NONREACTIVE
HBV SURFACE AG SER QL: NONREACTIVE
HCV AB SER QL: NONREACTIVE
HCV S/CO RATIO: 0.18 S/CO
HGB A MFR BLD: 97.3 %
HGB A2 MFR BLD: 2.7 %
HGB FRACT BLD-IMP: NORMAL
HIV1+2 AB SPEC QL IA.RAPID: NONREACTIVE
LEAD BLD-MCNC: <1 UG/DL
N GONORRHOEA RRNA SPEC QL NAA+PROBE: NOT DETECTED
RUBV IGM FLD-ACNC: <20 AU/ML
SOURCE AMPLIFICATION: NORMAL

## 2025-03-03 LAB
M TB IFN-G BLD-IMP: NEGATIVE
QUANTIFERON TB PLUS MITOGEN MINUS NIL: >10 IU/ML
QUANTIFERON TB PLUS NIL: 0.02 IU/ML
QUANTIFERON TB PLUS TB1 MINUS NIL: 0 IU/ML
QUANTIFERON TB PLUS TB2 MINUS NIL: 0 IU/ML

## 2025-03-04 ENCOUNTER — TRANSCRIPTION ENCOUNTER (OUTPATIENT)
Age: 35
End: 2025-03-04

## 2025-03-14 ENCOUNTER — TRANSCRIPTION ENCOUNTER (OUTPATIENT)
Age: 35
End: 2025-03-14

## 2025-03-19 ENCOUNTER — APPOINTMENT (OUTPATIENT)
Dept: OBGYN | Facility: CLINIC | Age: 35
End: 2025-03-19
Payer: COMMERCIAL

## 2025-03-19 ENCOUNTER — ASOB RESULT (OUTPATIENT)
Age: 35
End: 2025-03-19

## 2025-03-19 ENCOUNTER — APPOINTMENT (OUTPATIENT)
Dept: ANTEPARTUM | Facility: CLINIC | Age: 35
End: 2025-03-19
Payer: COMMERCIAL

## 2025-03-19 VITALS
BODY MASS INDEX: 26.06 KG/M2 | WEIGHT: 138 LBS | HEIGHT: 61 IN | DIASTOLIC BLOOD PRESSURE: 77 MMHG | SYSTOLIC BLOOD PRESSURE: 131 MMHG

## 2025-03-19 PROCEDURE — 76813 OB US NUCHAL MEAS 1 GEST: CPT

## 2025-03-19 PROCEDURE — 0500F INITIAL PRENATAL CARE VISIT: CPT

## 2025-03-19 PROCEDURE — 36415 COLL VENOUS BLD VENIPUNCTURE: CPT

## 2025-03-22 LAB
APPEARANCE: CLEAR
BILIRUBIN URINE: NEGATIVE
BLOOD URINE: NEGATIVE
COLOR: YELLOW
GLUCOSE QUALITATIVE U: NEGATIVE
KETONES URINE: NEGATIVE
LEUKOCYTE ESTERASE URINE: ABNORMAL
NITRITE URINE: NEGATIVE
PH URINE: 6.5
PROTEIN URINE: NEGATIVE
SPECIFIC GRAVITY URINE: <=1.005
UROBILINOGEN URINE: 0.2 (ref 0.2–?)

## 2025-04-14 ENCOUNTER — TRANSCRIPTION ENCOUNTER (OUTPATIENT)
Age: 35
End: 2025-04-14

## 2025-04-17 ENCOUNTER — NON-APPOINTMENT (OUTPATIENT)
Age: 35
End: 2025-04-17

## 2025-04-18 ENCOUNTER — APPOINTMENT (OUTPATIENT)
Dept: OBGYN | Facility: CLINIC | Age: 35
End: 2025-04-18
Payer: COMMERCIAL

## 2025-04-18 VITALS
HEIGHT: 61 IN | WEIGHT: 139 LBS | BODY MASS INDEX: 26.24 KG/M2 | DIASTOLIC BLOOD PRESSURE: 73 MMHG | SYSTOLIC BLOOD PRESSURE: 114 MMHG

## 2025-04-18 LAB
APPEARANCE: CLEAR
BILIRUBIN URINE: NEGATIVE
BLOOD URINE: ABNORMAL
COLOR: YELLOW
GLUCOSE QUALITATIVE U: NEGATIVE
KETONES URINE: NEGATIVE
LEUKOCYTE ESTERASE URINE: NEGATIVE
NITRITE URINE: NEGATIVE
PH URINE: 7
PROTEIN URINE: NEGATIVE
SPECIFIC GRAVITY URINE: 1.01
UROBILINOGEN URINE: 0.2 (ref 0.2–?)

## 2025-04-18 PROCEDURE — 36415 COLL VENOUS BLD VENIPUNCTURE: CPT

## 2025-04-18 PROCEDURE — 0502F SUBSEQUENT PRENATAL CARE: CPT

## 2025-04-21 LAB
AFP INTERP SERPL-IMP: NORMAL
AFP INTERP SERPL-IMP: NORMAL
AFP MOM CUT-OFF: 2.5
AFP MOM SERPL: 1.03
AFP PERCENTILE: 53.5
AFP SERPL-ACNC: 37.93 NG/ML
CARBAMAZEPINE?: NO
CURRENT SMOKER: NORMAL
DIABETES STATUS PATIENT: NORMAL
GA: NORMAL
GESTATIONAL AGE METHOD: NORMAL
HX OF NTD NARR: NORMAL
MULTIPLE PREGNANCY: NORMAL
NEURAL TUBE DEFECT RISK FETUS: NORMAL
NEURAL TUBE DEFECT RISK POP: NORMAL
RECOM F/U: NO
TEST PERFORMANCE INFO SPEC: NORMAL
VALPROIC ACID?: NORMAL

## 2025-05-19 ENCOUNTER — APPOINTMENT (OUTPATIENT)
Dept: ANTEPARTUM | Facility: CLINIC | Age: 35
End: 2025-05-19
Payer: COMMERCIAL

## 2025-05-19 ENCOUNTER — NON-APPOINTMENT (OUTPATIENT)
Age: 35
End: 2025-05-19

## 2025-05-19 ENCOUNTER — ASOB RESULT (OUTPATIENT)
Age: 35
End: 2025-05-19

## 2025-05-19 ENCOUNTER — APPOINTMENT (OUTPATIENT)
Dept: OBGYN | Facility: CLINIC | Age: 35
End: 2025-05-19
Payer: COMMERCIAL

## 2025-05-19 VITALS
WEIGHT: 144 LBS | DIASTOLIC BLOOD PRESSURE: 79 MMHG | BODY MASS INDEX: 27.19 KG/M2 | SYSTOLIC BLOOD PRESSURE: 120 MMHG | HEIGHT: 61 IN

## 2025-05-19 LAB
APPEARANCE: CLEAR
BILIRUBIN URINE: NEGATIVE
BLOOD URINE: NEGATIVE
COLOR: YELLOW
GLUCOSE QUALITATIVE U: NEGATIVE
KETONES URINE: NEGATIVE
LEUKOCYTE ESTERASE URINE: ABNORMAL
NITRITE URINE: NEGATIVE
PH URINE: 7
PROTEIN URINE: NEGATIVE
SPECIFIC GRAVITY URINE: 1.01
UROBILINOGEN URINE: 0.2 (ref 0.2–?)

## 2025-05-19 PROCEDURE — 76817 TRANSVAGINAL US OBSTETRIC: CPT

## 2025-05-19 PROCEDURE — 0502F SUBSEQUENT PRENATAL CARE: CPT

## 2025-05-19 PROCEDURE — 76811 OB US DETAILED SNGL FETUS: CPT

## 2025-05-28 ENCOUNTER — APPOINTMENT (OUTPATIENT)
Dept: ANTEPARTUM | Facility: CLINIC | Age: 35
End: 2025-05-28
Payer: COMMERCIAL

## 2025-05-28 ENCOUNTER — ASOB RESULT (OUTPATIENT)
Age: 35
End: 2025-05-28

## 2025-05-28 PROCEDURE — 76816 OB US FOLLOW-UP PER FETUS: CPT

## 2025-06-13 ENCOUNTER — ASOB RESULT (OUTPATIENT)
Age: 35
End: 2025-06-13

## 2025-06-13 ENCOUNTER — APPOINTMENT (OUTPATIENT)
Dept: ANTEPARTUM | Facility: CLINIC | Age: 35
End: 2025-06-13
Payer: COMMERCIAL

## 2025-06-13 ENCOUNTER — APPOINTMENT (OUTPATIENT)
Dept: OBGYN | Facility: CLINIC | Age: 35
End: 2025-06-13
Payer: COMMERCIAL

## 2025-06-13 ENCOUNTER — NON-APPOINTMENT (OUTPATIENT)
Age: 35
End: 2025-06-13

## 2025-06-13 PROBLEM — R31.9 HEMATURIA: Status: ACTIVE | Noted: 2025-06-13

## 2025-06-13 PROCEDURE — 76815 OB US LIMITED FETUS(S): CPT

## 2025-06-13 PROCEDURE — 76817 TRANSVAGINAL US OBSTETRIC: CPT

## 2025-06-13 PROCEDURE — 0502F SUBSEQUENT PRENATAL CARE: CPT

## 2025-06-16 LAB — BACTERIA UR CULT: NORMAL

## 2025-06-17 ENCOUNTER — APPOINTMENT (OUTPATIENT)
Dept: OBGYN | Facility: CLINIC | Age: 35
End: 2025-06-17

## 2025-06-17 VITALS
HEIGHT: 61 IN | WEIGHT: 150 LBS | SYSTOLIC BLOOD PRESSURE: 120 MMHG | BODY MASS INDEX: 28.32 KG/M2 | DIASTOLIC BLOOD PRESSURE: 79 MMHG

## 2025-06-17 LAB
APPEARANCE: CLEAR
BILIRUBIN URINE: NEGATIVE
BLOOD URINE: NEGATIVE
COLOR: YELLOW
GLUCOSE QUALITATIVE U: NEGATIVE
KETONES URINE: NEGATIVE
LEUKOCYTE ESTERASE URINE: NEGATIVE
NITRITE URINE: NEGATIVE
PH URINE: 7
PROTEIN URINE: NEGATIVE
SPECIFIC GRAVITY URINE: 1.01
UROBILINOGEN URINE: 0.2 (ref 0.2–?)

## 2025-06-17 PROCEDURE — 0502F SUBSEQUENT PRENATAL CARE: CPT

## 2025-07-08 ENCOUNTER — APPOINTMENT (OUTPATIENT)
Dept: OBGYN | Facility: CLINIC | Age: 35
End: 2025-07-08
Payer: COMMERCIAL

## 2025-07-08 VITALS
HEIGHT: 61 IN | DIASTOLIC BLOOD PRESSURE: 75 MMHG | SYSTOLIC BLOOD PRESSURE: 112 MMHG | WEIGHT: 153.56 LBS | BODY MASS INDEX: 28.99 KG/M2

## 2025-07-08 LAB
APPEARANCE: CLEAR
BILIRUBIN URINE: NEGATIVE
BLOOD URINE: ABNORMAL
COLOR: YELLOW
GLUCOSE BLDC GLUCOMTR-MCNC: 94
GLUCOSE QUALITATIVE U: NEGATIVE
KETONES URINE: NEGATIVE
LEUKOCYTE ESTERASE URINE: NEGATIVE
NITRITE URINE: NEGATIVE
PH URINE: 8
PROTEIN URINE: NEGATIVE
SPECIFIC GRAVITY URINE: 1.01
UROBILINOGEN URINE: 0.2 (ref 0.2–?)

## 2025-07-08 PROCEDURE — 36415 COLL VENOUS BLD VENIPUNCTURE: CPT

## 2025-07-08 PROCEDURE — 0502F SUBSEQUENT PRENATAL CARE: CPT

## 2025-07-09 ENCOUNTER — NON-APPOINTMENT (OUTPATIENT)
Age: 35
End: 2025-07-09

## 2025-07-09 LAB
GLUCOSE 1H P 50 G GLC PO SERPL-MCNC: 110 MG/DL
HCT VFR BLD CALC: 35.6 %
HGB BLD-MCNC: 11.1 G/DL
MCHC RBC-ENTMCNC: 29.1 PG
MCHC RBC-ENTMCNC: 31.2 G/DL
MCV RBC AUTO: 93.4 FL
PLATELET # BLD AUTO: 226 K/UL
RBC # BLD: 3.81 M/UL
RBC # FLD: 13.4 %
T PALLIDUM AB SER QL IA: NEGATIVE
WBC # FLD AUTO: 9.45 K/UL

## 2025-07-24 ENCOUNTER — ASOB RESULT (OUTPATIENT)
Age: 35
End: 2025-07-24

## 2025-07-24 ENCOUNTER — APPOINTMENT (OUTPATIENT)
Dept: ANTEPARTUM | Facility: CLINIC | Age: 35
End: 2025-07-24
Payer: COMMERCIAL

## 2025-07-24 ENCOUNTER — APPOINTMENT (OUTPATIENT)
Dept: OBGYN | Facility: CLINIC | Age: 35
End: 2025-07-24
Payer: COMMERCIAL

## 2025-07-24 VITALS
DIASTOLIC BLOOD PRESSURE: 77 MMHG | WEIGHT: 156 LBS | BODY MASS INDEX: 29.45 KG/M2 | HEIGHT: 61 IN | SYSTOLIC BLOOD PRESSURE: 117 MMHG

## 2025-07-24 PROCEDURE — 76816 OB US FOLLOW-UP PER FETUS: CPT

## 2025-07-24 PROCEDURE — 0502F SUBSEQUENT PRENATAL CARE: CPT

## 2025-08-06 ENCOUNTER — APPOINTMENT (OUTPATIENT)
Dept: OBGYN | Facility: CLINIC | Age: 35
End: 2025-08-06

## 2025-08-06 VITALS
DIASTOLIC BLOOD PRESSURE: 86 MMHG | BODY MASS INDEX: 30.02 KG/M2 | SYSTOLIC BLOOD PRESSURE: 135 MMHG | WEIGHT: 159 LBS | HEIGHT: 61 IN

## 2025-08-06 PROCEDURE — 90715 TDAP VACCINE 7 YRS/> IM: CPT

## 2025-08-06 PROCEDURE — 90471 IMMUNIZATION ADMIN: CPT

## 2025-08-06 PROCEDURE — 0502F SUBSEQUENT PRENATAL CARE: CPT

## 2025-08-16 LAB
APPEARANCE: CLEAR
BILIRUBIN URINE: NEGATIVE
BLOOD URINE: NEGATIVE
COLOR: YELLOW
GLUCOSE QUALITATIVE U: NEGATIVE
KETONES URINE: NEGATIVE
LEUKOCYTE ESTERASE URINE: ABNORMAL
NITRITE URINE: NEGATIVE
PH URINE: 7.5
PROTEIN URINE: NEGATIVE
SPECIFIC GRAVITY URINE: 1.02
UROBILINOGEN URINE: 0.2 (ref 0.2–?)

## 2025-08-21 ENCOUNTER — APPOINTMENT (OUTPATIENT)
Dept: ANTEPARTUM | Facility: CLINIC | Age: 35
End: 2025-08-21
Payer: COMMERCIAL

## 2025-08-21 ENCOUNTER — APPOINTMENT (OUTPATIENT)
Dept: OBGYN | Facility: CLINIC | Age: 35
End: 2025-08-21
Payer: COMMERCIAL

## 2025-08-21 ENCOUNTER — ASOB RESULT (OUTPATIENT)
Age: 35
End: 2025-08-21

## 2025-08-21 VITALS
BODY MASS INDEX: 30.96 KG/M2 | DIASTOLIC BLOOD PRESSURE: 80 MMHG | SYSTOLIC BLOOD PRESSURE: 118 MMHG | WEIGHT: 164 LBS | HEIGHT: 61 IN

## 2025-08-21 PROCEDURE — 76816 OB US FOLLOW-UP PER FETUS: CPT

## 2025-08-21 PROCEDURE — 76819 FETAL BIOPHYS PROFIL W/O NST: CPT

## 2025-08-21 PROCEDURE — 0502F SUBSEQUENT PRENATAL CARE: CPT

## 2025-09-08 ENCOUNTER — APPOINTMENT (OUTPATIENT)
Dept: OBGYN | Facility: CLINIC | Age: 35
End: 2025-09-08
Payer: COMMERCIAL

## 2025-09-08 VITALS
BODY MASS INDEX: 31.54 KG/M2 | WEIGHT: 167.06 LBS | SYSTOLIC BLOOD PRESSURE: 136 MMHG | HEIGHT: 61 IN | DIASTOLIC BLOOD PRESSURE: 70 MMHG

## 2025-09-08 PROCEDURE — 0502F SUBSEQUENT PRENATAL CARE: CPT

## 2025-09-08 PROCEDURE — 36415 COLL VENOUS BLD VENIPUNCTURE: CPT

## 2025-09-15 ENCOUNTER — APPOINTMENT (OUTPATIENT)
Dept: OBGYN | Facility: CLINIC | Age: 35
End: 2025-09-15

## 2025-09-15 VITALS
WEIGHT: 169 LBS | BODY MASS INDEX: 31.91 KG/M2 | DIASTOLIC BLOOD PRESSURE: 78 MMHG | HEIGHT: 61 IN | SYSTOLIC BLOOD PRESSURE: 130 MMHG

## 2025-09-19 LAB
APPEARANCE: CLEAR
B-HEM STREP SPEC QL CULT: NORMAL
BILIRUBIN URINE: NEGATIVE
BLOOD URINE: NEGATIVE
COLOR: YELLOW
GLUCOSE QUALITATIVE U: NEGATIVE
HCV AB SER QL: NONREACTIVE
HCV S/CO RATIO: 0.12 S/CO
HIV1+2 AB SPEC QL IA.RAPID: NONREACTIVE
KETONES URINE: NEGATIVE
LEUKOCYTE ESTERASE URINE: ABNORMAL
NITRITE URINE: NEGATIVE
PH URINE: 7.5
PROTEIN URINE: NEGATIVE
SPECIFIC GRAVITY URINE: 1.01
T PALLIDUM AB SER QL IA: NEGATIVE
UROBILINOGEN URINE: 0.2 (ref 0.2–?)